# Patient Record
Sex: FEMALE | Race: WHITE | NOT HISPANIC OR LATINO | Employment: FULL TIME | URBAN - METROPOLITAN AREA
[De-identification: names, ages, dates, MRNs, and addresses within clinical notes are randomized per-mention and may not be internally consistent; named-entity substitution may affect disease eponyms.]

---

## 2022-10-05 ENCOUNTER — TELEPHONE (OUTPATIENT)
Dept: NEUROLOGY | Facility: CLINIC | Age: 52
End: 2022-10-05

## 2022-10-05 NOTE — TELEPHONE ENCOUNTER
Per staff message   Pt accepted appt for 10/07/2022 130pm in the [facility] location with Dr Leila Marin

## 2022-10-05 NOTE — TELEPHONE ENCOUNTER
Pt accepted appt for 10/07/2022 130pm in the [facility] location with Dr Keane Lux

## 2022-10-05 NOTE — TELEPHONE ENCOUNTER
Received message from Nayla Campbell- pt's sister in law reached out to advise pt needs appt with Dr Kasper/MS team due to recent MRI findings  Dr Kasper is no longer accepting new MS patients  Dr Mendes however does have availability on Friday  Pulled records from [facility]  Per MRI brain results, "Impression:     1  Multiple T2/FLAIR signal hyperintensities within the periventricular and   juxtacortical white matter many which demonstrate contrast enhancement  Findings   highly suggestive of multifocal demyelination/multiple sclerosis  2  The largest presumed demyelinating plaque within the right periventricular   white matter measures roughly 7 mm "    Pt's PCP placed referral for tremor, numbness & tingling, and abnormal brain MRI  TT sent to Dr DOMINGUEZ  She is agreeable to see patient  States she will also ask Aiyana Dixon to assist with scheduling

## 2022-10-07 ENCOUNTER — APPOINTMENT (OUTPATIENT)
Dept: LAB | Facility: MEDICAL CENTER | Age: 52
End: 2022-10-07
Payer: COMMERCIAL

## 2022-10-07 ENCOUNTER — CONSULT (OUTPATIENT)
Dept: NEUROLOGY | Facility: CLINIC | Age: 52
End: 2022-10-07
Payer: COMMERCIAL

## 2022-10-07 ENCOUNTER — TELEPHONE (OUTPATIENT)
Dept: NEUROLOGY | Facility: CLINIC | Age: 52
End: 2022-10-07

## 2022-10-07 VITALS
RESPIRATION RATE: 18 BRPM | BODY MASS INDEX: 26.02 KG/M2 | WEIGHT: 156.2 LBS | HEIGHT: 65 IN | SYSTOLIC BLOOD PRESSURE: 120 MMHG | DIASTOLIC BLOOD PRESSURE: 78 MMHG | HEART RATE: 79 BPM | TEMPERATURE: 98.1 F

## 2022-10-07 DIAGNOSIS — R25.1 TREMOR: Primary | ICD-10-CM

## 2022-10-07 DIAGNOSIS — G37.9 CNS DEMYELINATION (HCC): ICD-10-CM

## 2022-10-07 DIAGNOSIS — G37.9 CNS DEMYELINATION (HCC): Primary | ICD-10-CM

## 2022-10-07 DIAGNOSIS — F43.22 ADJUSTMENT DISORDER WITH ANXIOUS MOOD: ICD-10-CM

## 2022-10-07 DIAGNOSIS — R29.898 RIGHT ARM WEAKNESS: ICD-10-CM

## 2022-10-07 DIAGNOSIS — R25.1 TREMOR: ICD-10-CM

## 2022-10-07 LAB
BUN SERPL-MCNC: 11 MG/DL (ref 5–25)
CREAT SERPL-MCNC: 0.74 MG/DL (ref 0.6–1.3)
CRP SERPL QL: 6 MG/L
ERYTHROCYTE [SEDIMENTATION RATE] IN BLOOD: 24 MM/HOUR (ref 0–29)
GFR SERPL CREATININE-BSD FRML MDRD: 93 ML/MIN/1.73SQ M

## 2022-10-07 PROCEDURE — 85652 RBC SED RATE AUTOMATED: CPT

## 2022-10-07 PROCEDURE — 86363 MOG-IGG1 ANTB FLO CYTMTRY EA: CPT

## 2022-10-07 PROCEDURE — 86431 RHEUMATOID FACTOR QUANT: CPT

## 2022-10-07 PROCEDURE — 84520 ASSAY OF UREA NITROGEN: CPT

## 2022-10-07 PROCEDURE — 99205 OFFICE O/P NEW HI 60 MIN: CPT | Performed by: PSYCHIATRY & NEUROLOGY

## 2022-10-07 PROCEDURE — 36415 COLL VENOUS BLD VENIPUNCTURE: CPT

## 2022-10-07 PROCEDURE — 86140 C-REACTIVE PROTEIN: CPT

## 2022-10-07 PROCEDURE — 82565 ASSAY OF CREATININE: CPT

## 2022-10-07 PROCEDURE — 86038 ANTINUCLEAR ANTIBODIES: CPT

## 2022-10-07 PROCEDURE — 86053 AQAPRN-4 ANTB FLO CYTMTRY EA: CPT

## 2022-10-07 RX ORDER — SODIUM CHLORIDE 9 MG/ML
20 INJECTION, SOLUTION INTRAVENOUS ONCE
Status: CANCELLED | OUTPATIENT
Start: 2022-10-10

## 2022-10-07 RX ORDER — ZOLPIDEM TARTRATE 10 MG/1
10 TABLET ORAL
Qty: 30 TABLET | Refills: 0 | Status: SHIPPED | OUTPATIENT
Start: 2022-10-07

## 2022-10-07 RX ORDER — FAMOTIDINE 40 MG/1
40 TABLET, FILM COATED ORAL DAILY
Qty: 20 TABLET | Refills: 2 | Status: SHIPPED | OUTPATIENT
Start: 2022-10-07

## 2022-10-07 RX ORDER — FLUOXETINE 10 MG/1
10 CAPSULE ORAL DAILY
Qty: 30 CAPSULE | Refills: 1 | Status: SHIPPED | OUTPATIENT
Start: 2022-10-07

## 2022-10-07 NOTE — TELEPHONE ENCOUNTER
Pt has [employer]- per website [id number], [id number], and [id number] do not require PA  Therapy plan entered and sent for signature  TT sent  Referral updated

## 2022-10-07 NOTE — TELEPHONE ENCOUNTER
Plan is signed  Called [facility] infusion center and spoke with Corinne Payne  Scheduled pt for the following:    Monday 10/10/22 @ 1:30pm  Tuesday 10/11/22 @ 12pm  Thursday 10/13/22 @ 12pm  (There were absolutely no appointments on Wednesday)    Left detailed message for pt making her aware (okay per communication consent)  Provided phone # and address for infusion center  Asked for call back to confirm  MyChart message also sent

## 2022-10-07 NOTE — PROGRESS NOTES
Patient ID: Daksha Gallardo is a 46 y o  female  Assessment/Plan:           Problem List Items Addressed This Visit        Nervous and Auditory    CNS demyelination (HCC)    Relevant Medications    FLUoxetine (PROzac) 10 mg capsule    Other Relevant Orders    MRI cervical spine with and without contrast    MRI thoracic spine with and without contrast    Sedimentation rate, automated (Completed)    C-reactive protein (Completed)    KLEBER w/Reflex if Positive    MISCELLANEOUS LAB TEST    Rheumatoid Factor (IgA, IgG, IgM)    BUN (Completed)    Creatinine, serum (Completed)    Right arm weakness    Relevant Medications    FLUoxetine (PROzac) 10 mg capsule    Other Relevant Orders    MISCELLANEOUS LAB TEST    Rheumatoid Factor (IgA, IgG, IgM)       Other    Tremor - Primary    Relevant Orders    MRI cervical spine with and without contrast    MRI thoracic spine with and without contrast    MISCELLANEOUS LAB TEST    BUN (Completed)    Creatinine, serum (Completed)      Other Visit Diagnoses     Adjustment disorder with anxious mood        Relevant Medications    FLUoxetine (PROzac) 10 mg capsule         Mrs Anderson President has presented to [facility] multiple [address] with multiple neurological complaints and to rule out multiple sclerosis  Patient has developed multiple neurological complaints since 2012, including inability to walk with right-sided sensory dysfunction and local weakness  Patient was diagnosed with Hashimoto thyroiditis and fibromyalgia by other providers  Brain MRI was personally reviewed, patient was found to have multiple demyelinating plaques in periventricular and juxtacortical region, some of the lesions are with contrast enhancement  We extensively discussed potential differential diagnosis of clinical and radiographic findings, rheumatological, granulomatous condition, other inflammatory condition may present in similar pattern    Patient will be advised to consider 3 doses of Solu-Medrol 1 g IV, sleeping aid and Pepcid will be further recommended  Patient is to complete serological workup as well as imaging of the cervical and thoracic spine to comment on extension of her CNS demyelination  Patient was offered Prozac for mood disorder  We also discussed importance of healthy dietary approach and regular physical activity; patient may benefit from psychiatry and psychotherapy sessions  Patient ambulates without assistive devices  Patient is to follow with [facility] within 6-8 weeks  Subjective:tremors, numbness and tingling hands, abnormal MRI  HPI    Mrs David Cosby is a 51-year-old female who presented sensory dysfunction upper extremity and abnormal brain imaging  Patient described initial imaging of the brain completed in 2006 due to sinus infection and frequent headaches  Patient stated around 2012 she could not walk with achy and sore muscles described that time  Patient was diagnosed with Hashimoto and fibromyalgia  In April 2022 patient developed right upper extremity weakness with sharp pain  Patient stated pain at times severe enough which she is screaming in front of her kids  Patient also described right hip pain which can be sharp  Tightness in the ball of the right foot described in July of 2022  Patient also described having in her perception of tremulous since August 2022 which get worse and involves entire body; patient described significant stress in her life since 23/14 with multiple family members does and advanced disability, including quadriplegia in her sister due to drug abuse; patient stated she had son with SJS;   Patient described having viral infection requiring 2 antibacterial regimens recently  Patient had COVID-19 infection earlier as it took her 20 days to recover  Patient also stated she had hip progression joint pain  Patient described having mother with Hilario Combs and 2nd cousin with multiple sclerosis      MRI brain 9/2022: Multiple T2/FLAIR signal hyperintensities within the periventricular and   juxtacortical white matter many which demonstrate contrast enhancement  Findings   highly suggestive of multifocal demyelination/multiple sclerosis  2  The largest presumed demyelinating plaque within the right periventricular   white matter measures roughly 7 mm  MRI orbits/head and neck 2006: Note is made of partial opacification involving the left frontal sinus and left anterior ethmoid air cells as well as complete opacification of the left maxillary sinus  There appear to be secretions present within the sinus  The orbital floor appears intact  The visualized portions of the brain available for review are unremarkable  Cerebral parenchyma: There are multiple patchy more focal foci of increased   T2/FLAIR signal hyperintensity within the periventricular and juxtacortical   white matter bilaterally, many which demonstrate contrast enhancement  The   imaging morphology and distribution suggest demyelinating disease/multiple   sclerosis  Largest enhancing demyelinating plaque within the right posterior   corona radiata adjacent to the trigone of the lateral ventricle measures   approximately 0 9 x 0 7 cm  Enhancing focus within the right temporal lobe   measuring 0 8 x 0 4 cm There are greater than 10 the monitoring plaques   visualized within the supratentorial compartment  A few punctate foci of   enhancement within the left corona radiata, left peritrigonal white matter,   right occipital subcortical white matter  The following portions of the patient's history were reviewed and updated as appropriate:   She  has no past medical history on file  She   Patient Active Problem List    Diagnosis Date Noted   • Tremor 10/07/2022   • CNS demyelination ([facility] [address] ) 10/07/2022   • Right arm weakness 10/07/2022     She  has no past surgical history on file  Her family history is not on file    She  reports that she has been smoking cigarettes  She does not have any smokeless tobacco history on file  No history on file for alcohol use and drug use  Current Outpatient Medications   Medication Sig Dispense Refill   • FLUoxetine (PROzac) 10 mg capsule Take 1 capsule (10 mg total) by mouth daily 30 capsule 1   • famotidine (PEPCID) 40 MG tablet Take 1 tablet (40 mg total) by mouth daily 20 tablet 2   • zolpidem (AMBIEN) 10 mg tablet Take 1 tablet (10 mg total) by mouth daily at bedtime as needed for sleep 30 tablet 0     No current facility-administered medications for this visit  No current outpatient medications on file prior to visit  No current facility-administered medications on file prior to visit  She has no allergies on file            Objective:    Blood pressure 120/78, pulse 79, temperature 98 1 °F (36 7 °C), temperature source Tympanic, resp  rate 18, height 5' 5" (1 651 m), weight 70 9 kg (156 lb 3 2 oz)  Physical Exam    Neurological Exam  CONSTITUTIONAL: NAD, pleasant  NECK: supple, no lymphadenopathy, no thyromegaly, no JVD  CARDIOVASCULAR: RRR, normal S1S2, no murmurs, no rubs  RESP: clear to auscultation bilaterally, no wheezes/rhonchi/rales  ABDOMEN: soft, non tender, non distended  SKIN: no rash or skin lesions  EXTREMITIES: no edema, pulses 2+bilaterally  PSYCH: appropriate mood and affect  NEUROLOGIC COMPREHENSIVE EXAM: Patient is oriented to person, place and time, NAD; appropriate affect  CN II, III, IV, V, VI, VII,VIII,IX,X,XI-XII intact with EOMI, PERRLA, OKN intact, VF grossly intact, fundi poorly visualized secondary to pupillary constriction; symmetric face noted  Motor: 5/5 UE/LE bilateral symmetric, RUE 4/5 shoulder abduction; Sensory: intact to light touch and pinprick bilaterally; normal vibration sensation feet bilaterally; Coordination within normal limits on FTN and DAKOTA testing; DTR: 2++/4 through, no Babinski, no clonus  Tandem gait is abnormal  Romberg: present        ROS:  12 points of review of system was reviewed with the patient and was unremarkable with exception: see HPI  Review of Systems   Constitutional: Positive for fatigue  Negative for chills and fever  HENT: Positive for tinnitus (right ear)  Negative for ear pain and sore throat  Eyes: Positive for visual disturbance (blurry )  Negative for pain  Respiratory: Negative for cough and shortness of breath  Cardiovascular: Negative for chest pain and palpitations  Gastrointestinal: Negative for abdominal pain and vomiting  Genitourinary: Negative for dysuria and hematuria  Musculoskeletal: Negative for arthralgias and back pain  Skin: Negative for color change and rash  Neurological: Positive for tremors (bilateral hands and feet - slight) and numbness (right arm)  Negative for seizures and syncope  All other systems reviewed and are negative

## 2022-10-07 NOTE — TELEPHONE ENCOUNTER
Please help with 3 doses of Solumedrol 1000 mg IV - [facility]

## 2022-10-09 LAB — RYE IGE QN: NEGATIVE

## 2022-10-10 ENCOUNTER — HOSPITAL ENCOUNTER (OUTPATIENT)
Dept: INFUSION CENTER | Facility: HOSPITAL | Age: 52
Discharge: HOME | End: 2022-10-10
Attending: PSYCHIATRY & NEUROLOGY
Payer: COMMERCIAL

## 2022-10-10 VITALS
HEART RATE: 86 BPM | RESPIRATION RATE: 18 BRPM | DIASTOLIC BLOOD PRESSURE: 66 MMHG | TEMPERATURE: 97.8 F | OXYGEN SATURATION: 96 % | SYSTOLIC BLOOD PRESSURE: 117 MMHG

## 2022-10-10 DIAGNOSIS — G37.9 CNS DEMYELINATION (HCC): Primary | ICD-10-CM

## 2022-10-10 PROCEDURE — 96365 THER/PROPH/DIAG IV INF INIT: CPT

## 2022-10-10 RX ORDER — SODIUM CHLORIDE 9 MG/ML
20 INJECTION, SOLUTION INTRAVENOUS ONCE
Status: COMPLETED | OUTPATIENT
Start: 2022-10-10 | End: 2022-10-10

## 2022-10-10 RX ORDER — SODIUM CHLORIDE 9 MG/ML
20 INJECTION, SOLUTION INTRAVENOUS ONCE
Status: CANCELLED | OUTPATIENT
Start: 2022-10-11

## 2022-10-10 RX ADMIN — SODIUM CHLORIDE 20 ML/HR: 0.9 INJECTION, SOLUTION INTRAVENOUS at 13:38

## 2022-10-10 RX ADMIN — SODIUM CHLORIDE 1000 MG: 0.9 INJECTION, SOLUTION INTRAVENOUS at 13:38

## 2022-10-10 NOTE — PROGRESS NOTES
Pt ambulated into infusion in stable condition  pt oriented to unit  Pt states she does have tremors, that is why tx needed to be started so soon

## 2022-10-10 NOTE — PLAN OF CARE
Problem: Potential for Falls  Goal: Patient will remain free of falls  Description: INTERVENTIONS:  - Educate patient/family on patient safety including physical limitations  - Instruct patient to call for assistance with activity   - Consult OT/PT to assist with strengthening/mobility   - Keep Call bell within reach  - Keep bed low and locked with side rails adjusted as appropriate  - Keep care items and personal belongings within reach  - Initiate and maintain comfort rounds      - Consider moving patient to room near nurses station  Outcome: Progressing     Problem: DISCHARGE PLANNING  Goal: Discharge to home or other facility with appropriate resources  Description: INTERVENTIONS:  - Identify barriers to discharge w/patient and caregiver  - Arrange for needed discharge resources and transportation as appropriate  - Identify discharge learning needs (meds, wound care, etc )  - Arrange for interpretive services to assist at discharge as needed  - Refer to Case Management Department for coordinating discharge planning if the patient needs post-hospital services based on physician/advanced practitioner order or complex needs related to functional status, cognitive ability, or social support system  Outcome: Progressing     Problem: Knowledge Deficit  Goal: Patient/family/caregiver demonstrates understanding of disease process, treatment plan, medications, and discharge instructions  Description: Complete learning assessment and assess knowledge base    Interventions:  - Provide teaching at level of understanding  - Provide teaching via preferred learning methods  Outcome: Progressing

## 2022-10-11 ENCOUNTER — TELEPHONE (OUTPATIENT)
Dept: NEUROLOGY | Facility: CLINIC | Age: 52
End: 2022-10-11

## 2022-10-11 ENCOUNTER — HOSPITAL ENCOUNTER (OUTPATIENT)
Dept: INFUSION CENTER | Facility: HOSPITAL | Age: 52
Discharge: HOME | End: 2022-10-11
Attending: PSYCHIATRY & NEUROLOGY
Payer: COMMERCIAL

## 2022-10-11 VITALS
OXYGEN SATURATION: 95 % | SYSTOLIC BLOOD PRESSURE: 117 MMHG | TEMPERATURE: 97.9 F | RESPIRATION RATE: 18 BRPM | HEART RATE: 109 BPM | DIASTOLIC BLOOD PRESSURE: 74 MMHG

## 2022-10-11 DIAGNOSIS — G37.9 CNS DEMYELINATION (HCC): Primary | ICD-10-CM

## 2022-10-11 PROCEDURE — 96365 THER/PROPH/DIAG IV INF INIT: CPT

## 2022-10-11 RX ORDER — CYANOCOBALAMIN (VITAMIN B-12) 500 MCG
1000 TABLET ORAL
COMMUNITY

## 2022-10-11 RX ORDER — MELATONIN
1000 DAILY
COMMUNITY

## 2022-10-11 RX ORDER — DIPHENHYDRAMINE HCL 25 MG
25 CAPSULE ORAL
COMMUNITY

## 2022-10-11 RX ORDER — SODIUM CHLORIDE 9 MG/ML
20 INJECTION, SOLUTION INTRAVENOUS ONCE
Status: CANCELLED | OUTPATIENT
Start: 2022-10-13

## 2022-10-11 RX ORDER — SODIUM CHLORIDE 9 MG/ML
20 INJECTION, SOLUTION INTRAVENOUS ONCE
Status: COMPLETED | OUTPATIENT
Start: 2022-10-11 | End: 2022-10-11

## 2022-10-11 RX ADMIN — SODIUM CHLORIDE 1000 MG: 0.9 INJECTION, SOLUTION INTRAVENOUS at 12:20

## 2022-10-11 RX ADMIN — SODIUM CHLORIDE 20 ML/HR: 0.9 INJECTION, SOLUTION INTRAVENOUS at 12:04

## 2022-10-11 NOTE — TELEPHONE ENCOUNTER
Phone pt to ask if we send MRI Brain Cd dos 09/29/2022 to mail or to keep to next appt  Pt prefers to keep at our office till next appt 11/11/2022 will keep in  in [place]

## 2022-10-13 ENCOUNTER — HOSPITAL ENCOUNTER (OUTPATIENT)
Dept: INFUSION CENTER | Facility: HOSPITAL | Age: 52
Discharge: HOME | End: 2022-10-13
Attending: PSYCHIATRY & NEUROLOGY
Payer: COMMERCIAL

## 2022-10-13 VITALS
TEMPERATURE: 97.8 F | OXYGEN SATURATION: 93 % | SYSTOLIC BLOOD PRESSURE: 125 MMHG | HEART RATE: 93 BPM | RESPIRATION RATE: 18 BRPM | DIASTOLIC BLOOD PRESSURE: 68 MMHG

## 2022-10-13 DIAGNOSIS — G37.9 CNS DEMYELINATION (HCC): Primary | ICD-10-CM

## 2022-10-13 LAB — MISCELLANEOUS LAB TEST RESULT: NORMAL

## 2022-10-13 PROCEDURE — 96365 THER/PROPH/DIAG IV INF INIT: CPT

## 2022-10-13 RX ORDER — SODIUM CHLORIDE 9 MG/ML
20 INJECTION, SOLUTION INTRAVENOUS ONCE
Status: COMPLETED | OUTPATIENT
Start: 2022-10-13 | End: 2022-10-13

## 2022-10-13 RX ORDER — SODIUM CHLORIDE 9 MG/ML
20 INJECTION, SOLUTION INTRAVENOUS ONCE
Status: CANCELLED | OUTPATIENT
Start: 2022-10-13

## 2022-10-13 RX ADMIN — SODIUM CHLORIDE 20 ML/HR: 0.9 INJECTION, SOLUTION INTRAVENOUS at 14:51

## 2022-10-13 RX ADMIN — SODIUM CHLORIDE 1000 MG: 0.9 INJECTION, SOLUTION INTRAVENOUS at 14:52

## 2022-10-13 NOTE — PLAN OF CARE
Problem: Potential for Falls  Goal: Patient will remain free of falls  Description: INTERVENTIONS:  - Educate patient/family on patient safety including physical limitations  - Instruct patient to call for assistance with activity   - Consult OT/PT to assist with strengthening/mobility   - Keep Call bell within reach  - Keep bed low and locked with side rails adjusted as appropriate  - Keep care items and personal belongings within reach  - Initiate and maintain comfort rounds  -    - Apply yellow socks and bracelet for high fall risk patients  - Consider moving patient to room near nurses station  Outcome: Progressing     Problem: DISCHARGE PLANNING  Goal: Discharge to home or other facility with appropriate resources  Description: INTERVENTIONS:  - Identify barriers to discharge w/patient and caregiver  - Arrange for needed discharge resources and transportation as appropriate  - Identify discharge learning needs (meds, wound care, etc )  - Arrange for interpretive services to assist at discharge as needed  - Refer to Case Management Department for coordinating discharge planning if the patient needs post-hospital services based on physician/advanced practitioner order or complex needs related to functional status, cognitive ability, or social support system  Outcome: Progressing     Problem: Knowledge Deficit  Goal: Patient/family/caregiver demonstrates understanding of disease process, treatment plan, medications, and discharge instructions  Description: Complete learning assessment and assess knowledge base    Interventions:  - Provide teaching at level of understanding  - Provide teaching via preferred learning methods  Outcome: Progressing

## 2022-10-19 ENCOUNTER — TELEPHONE (OUTPATIENT)
Dept: NEUROLOGY | Facility: CLINIC | Age: 52
End: 2022-10-19

## 2022-10-19 NOTE — TELEPHONE ENCOUNTER
----- Message from Crawford Apgar, MD sent at 10/18/2022  1:12 PM EDT -----  Please review mildly elevated Rf IGG - patient is to follow with PCP for further recommendations

## 2022-10-21 ENCOUNTER — HOSPITAL ENCOUNTER (OUTPATIENT)
Dept: MRI IMAGING | Facility: HOSPITAL | Age: 52
Discharge: HOME | End: 2022-10-21
Attending: PSYCHIATRY & NEUROLOGY
Payer: COMMERCIAL

## 2022-10-21 DIAGNOSIS — R25.1 TREMOR: ICD-10-CM

## 2022-10-21 DIAGNOSIS — G37.9 CNS DEMYELINATION (HCC): ICD-10-CM

## 2022-10-21 PROCEDURE — A9585 GADOBUTROL INJECTION: HCPCS | Performed by: PSYCHIATRY & NEUROLOGY

## 2022-10-21 PROCEDURE — 72156 MRI NECK SPINE W/O & W/DYE: CPT

## 2022-10-21 PROCEDURE — 72157 MRI CHEST SPINE W/O & W/DYE: CPT

## 2022-10-21 PROCEDURE — G1004 CDSM NDSC: HCPCS

## 2022-10-21 RX ADMIN — GADOBUTROL 7 ML: 604.72 INJECTION INTRAVENOUS at 14:54

## 2022-10-21 RX ADMIN — GADOBUTROL 7 ML: 604.72 INJECTION INTRAVENOUS at 15:03

## 2022-10-26 ENCOUNTER — PATIENT MESSAGE (OUTPATIENT)
Dept: NEUROLOGY | Facility: CLINIC | Age: 52
End: 2022-10-26

## 2022-11-03 NOTE — PATIENT COMMUNICATION
Called patient  Received vm  Left detailed msg (per consent in chart) advising patient that chronic means longstanding  Not acute or new  Advised patient Dr Jerel Sosa will be going over MRI results at patient's f/u appt on 11/11/22

## 2022-11-09 ENCOUNTER — TELEPHONE (OUTPATIENT)
Dept: NEUROLOGY | Facility: CLINIC | Age: 52
End: 2022-11-09

## 2022-11-09 NOTE — TELEPHONE ENCOUNTER
Reminder appt call     Patient is scheduled on 11/11/2022 @ 11 am with an arrival time  1045 am in the [facility] location with Dr Harmeet Kearns       Left message on vm (1)

## 2022-11-11 ENCOUNTER — TELEMEDICINE (OUTPATIENT)
Dept: NEUROLOGY | Facility: CLINIC | Age: 52
End: 2022-11-11

## 2022-11-11 ENCOUNTER — TELEPHONE (OUTPATIENT)
Dept: NEUROLOGY | Facility: CLINIC | Age: 52
End: 2022-11-11

## 2022-11-11 VITALS — HEIGHT: 65 IN | WEIGHT: 152 LBS | BODY MASS INDEX: 25.33 KG/M2

## 2022-11-11 DIAGNOSIS — R76.8 RHEUMATOID FACTOR POSITIVE: ICD-10-CM

## 2022-11-11 DIAGNOSIS — G35 MS (MULTIPLE SCLEROSIS) (HCC): Primary | ICD-10-CM

## 2022-11-11 DIAGNOSIS — R29.898 RIGHT ARM WEAKNESS: ICD-10-CM

## 2022-11-11 DIAGNOSIS — R20.0 NUMBNESS OF RIGHT FOOT: ICD-10-CM

## 2022-11-11 NOTE — PROGRESS NOTES
Virtual Regular Visit    Verification of patient location:    Patient is located in the following state [place] which I [place]  Assessment/Plan:    Problem List Items Addressed This Visit        Nervous and Auditory    Right arm weakness    Relevant Orders    Ambulatory Referral to Rheumatology    MS (multiple sclerosis) ([facility] [address] ) - Primary       Other    Numbness of right foot    Relevant Orders    Ambulatory Referral to Rheumatology      Other Visit Diagnoses     Rheumatoid factor positive        Relevant Orders    Ambulatory Referral to Rheumatology               Reason for visit is FU  Chief Complaint   Patient presents with   • Virtual Regular Visit        Encounter provider Glen Mahajan MD    Provider located at [address] [facility]  [address] [id number]      Recent Visits  No visits were found meeting these conditions  Showing recent visits within past 7 days and meeting all other requirements  Today's Visits  Date Type Provider Dept   11/11/22 Telephone Glen Mahajan MD [facility] [place]   11/11/22 Telemedicine Glen Mahajan MD [facility] [place]   Showing today's visits and meeting all other requirements  Future Appointments  No visits were found meeting these conditions  Showing future appointments within next 150 days and meeting all other requirements       The patient was identified by name and date of birth  Jerel Titus was informed that this is a telemedicine visit and that the visit is being conducted through the [employer]  She agrees to proceed     My office door was closed  No one else was in the room  She acknowledged consent and understanding of privacy and security of the video platform  The patient has agreed to participate and understands they can discontinue the visit at any time  Patient is aware this is a billable service       Subjective  Jerel Titus is a 46 y o  female with MS and related issues   HPI   Mrs Lo Villanueva has presented to [facility] multiple [address] with multiple neurological complaints  Patient had completed 3 doses of Solu-Medrol with side effects has been reported  Patient finally feels good right now for the past week the patient with tremors has near completely improved  Patient stated she has a right foot sensory dysfunction on an off at the ball of her foot with much less sensory dysfunction in the left foot as well  Patient has developed multiple neurological complaints since 2012, including inability to walk with right-sided sensory dysfunction and local weakness  Patient was diagnosed with Hashimoto thyroiditis and fibromyalgia by other providers  Brain MRI was personally reviewed, patient was found to have multiple demyelinating plaques in periventricular and juxtacortical region, some of the lesions are with contrast enhancement  Patient is to complete serological workup as well  Patient was offered Prozac for mood disorder      We also discussed importance of healthy dietary approach and regular physical activity; patient may benefit from psychiatry and psychotherapy sessions  MRI C-spine 10/2022: No evidence of demyelinating disease or abnormal enhancement in cervical spine      Please see same-day MRI thoracic spine with and without contrast for further evaluation  1 3 cm T2 hyperintense nodule in right thyroid lobe (7:2)      MRI T-spine 10/2021: Small demyelinating lesion in T12 spinal cord  No abnormal enhancement in thoracic spine to suggest active demyelination      Mild degenerative changes of thoracic spine  No significant canal stenosis or foraminal narrowing  Minerva Riedel    Mrs Lo Villanueva has presented to [facility] multiple [address] for follow-up  Patient completed radiographic and serological workup, she is here today to discuss her findings    Patient previously was recognize multiple demyelinating plaque in her brain parenchyma including chronic and acute as she received 3 doses of Solu-Medrol  Patient had suffered from steroid side effects, eventually patient improved and now she is feeling well  Tremors has subsided  Patient completed imaging cervical and thoracic spine she has single demyelinating plaque at T12, no active demyelination appreciated  Considering clinical and radiographic presentation, patient meets 2017 [employer] criteria for diagnosis of multiple sclerosis  Serologic workup was completed as patient negative for NMO/MOG  Patient does not require spinal tap at this point considering her radiographic findings  Patient does have borderline elevated rheumatoid factor IgG antibody as well as C-reactive protein at 6 0, patient will follow with Rheumatology team to complete her workup  Patient was diagnosed with likely benign relapsing remitting multiple sclerosis with good repairing capacity, the same time patient does have clinical presentation described 10 years ago with residual sensory dysfunction in her feet noted now  Patient was advised to consider Aubagio versus Vumerity, we briefly discussed side effect profile as well as risk and benefits of initiating disease modifying regimen for multiple sclerosis  Patient is to consider which regimen she would prefer up going forward, patient favors Aubagio at this point  Booklet and forms for both regimens will be mailed to the patient  Patient ambulates without assistive devices  History reviewed  No pertinent past medical history  History reviewed  No pertinent surgical history      Current Outpatient Medications   Medication Sig Dispense Refill   • cholecalciferol (VITAMIN D3) 1,000 units tablet Take 1,000 Units by mouth daily Take 2 tabs (2000mg) PO daily     • Cyanocobalamin (Vitamin B 12) 500 MCG TABS Take 1,000 mcg by mouth Take 2 tabs (1000mg) PO daily     • famotidine (PEPCID) 40 MG tablet Take 1 tablet (40 mg total) by mouth daily 20 tablet 2   • FLUoxetine (PROzac) 10 mg capsule Take 1 capsule (10 mg total) by mouth daily 30 capsule 1   • diphenhydrAMINE (BENADRYL) 25 mg capsule Take 25 mg by mouth daily at bedtime as needed for sleep Take 25mg PO PRN for insomnia     • zolpidem (AMBIEN) 10 mg tablet Take 1 tablet (10 mg total) by mouth daily at bedtime as needed for sleep (Patient not taking: Reported on 10/11/2022) 30 tablet 0     No current facility-administered medications for this visit  Allergies   Allergen Reactions   • Sulfa Antibiotics Other (See Comments)     Family history ngo jermaine syndrone   • Amoxicillin-Pot Clavulanate Diarrhea   • [employer] Fatigue       Review of Systems   Constitutional: Positive for fatigue  Negative for chills and fever  HENT: Negative for ear pain and sore throat  Eyes: Negative for pain and visual disturbance  Respiratory: Negative for cough and shortness of breath  Cardiovascular: Negative for chest pain and palpitations  Gastrointestinal: Negative for abdominal pain and vomiting  Genitourinary: Negative for dysuria and hematuria  Musculoskeletal: Negative for arthralgias and back pain  Skin: Negative for color change and rash  Neurological: Positive for tremors (bilateral legs)  Negative for seizures and syncope  All other systems reviewed and are negative  Video Exam    Vitals:    11/11/22 1056   Weight: 68 9 kg (152 lb)   Height: 5' 5" (1 651 m)       Physical Exam   CONSTITUTIONAL: NAD, pleasant  NECK: supple, PSYCH: appropriate mood and affect  NEUROLOGIC COMPREHENSIVE EXAM: Patient is oriented to person, place and time, NAD; appropriate affect  CN II, III, IV, V, VI, VII,VIII,IX,X,XI-XII intact with EOMI; symmetric face noted  Motor: grossly intact UE/LE bilateral symmetric; Coordination within normal limits on FTN and DAKOTA testing; Tandem gait is intact     I spent 25 minutes with patient today in which greater than 50% of the time was spent in counseling/coordination of care regarding Multiple sclerosis and related issues

## 2022-11-14 ENCOUNTER — TELEPHONE (OUTPATIENT)
Dept: NEUROLOGY | Facility: CLINIC | Age: 52
End: 2022-11-14

## 2022-11-14 NOTE — TELEPHONE ENCOUNTER
Phone pt to ask if she will be picking up CD of imaging or mail out to pt  Patient advised me to mail one CD MRI Brain dos 09/29/2022 to pt address we have on [address] and confirmed address

## 2022-11-15 DIAGNOSIS — G37.9 CNS DEMYELINATION (HCC): ICD-10-CM

## 2022-11-15 DIAGNOSIS — R29.898 RIGHT ARM WEAKNESS: ICD-10-CM

## 2022-11-15 DIAGNOSIS — F43.22 ADJUSTMENT DISORDER WITH ANXIOUS MOOD: ICD-10-CM

## 2022-11-17 RX ORDER — FLUOXETINE 10 MG/1
CAPSULE ORAL
Qty: 30 CAPSULE | Refills: 1 | Status: SHIPPED | OUTPATIENT
Start: 2022-11-17

## 2022-11-21 ENCOUNTER — TELEPHONE (OUTPATIENT)
Dept: NEUROLOGY | Facility: CLINIC | Age: 52
End: 2022-11-21

## 2022-11-21 DIAGNOSIS — G37.9 CNS DEMYELINATION (HCC): Primary | ICD-10-CM

## 2022-11-21 NOTE — TELEPHONE ENCOUNTER
Pt left voicemail stating she received medication start form and completed her section  Asked what she should do with the form  [phone]     Per last office note, start forms were sent for Aubagio and Vumerity  Pt will need to return signed form to our office  Called and spoke with pt  She reports she prefers Aubagio but will return both forms to our office  Pt agreeable to faxing this  Fax # provided  Awaiting start forms

## 2022-11-23 NOTE — TELEPHONE ENCOUNTER
Faxed forms received from pt  Placed in [place] fax folder  Aston Abreu, please assist  Pt pradeeps Gian  Thanks!

## 2022-12-05 NOTE — TELEPHONE ENCOUNTER
Aubagio Start form completed, signed and faxed to [phone] along with the copy of insurance card, med list and allergy list       Aubagio  Start Form will scan into patient's chart along with fax confirmation sheet when scanner available to me  Aubagio  Start Form scanned into pts chart

## 2022-12-06 NOTE — TELEPHONE ENCOUNTER
Awaiting start form to be scanned into chart  Pt prefers Gian DOMINGUEZ, no TB testing completed in the last year  Would you like this completed prior to pt starting Aubagio?

## 2022-12-07 NOTE — TELEPHONE ENCOUNTER
Hi, this is Nando Easley calling from [facility] regarding Dr Aurora Nieves pt, Alexsander Santillan,  [birth year]  We are in need of a prior auth for this patient  If you have an approval or denial already, Can fax that over to [employer]ya [employer] one  Their number is [phone]  You have any questions,you can give me a call at [phone]  Thank you

## 2022-12-07 NOTE — TELEPHONE ENCOUNTER
Left detailed message for pt (okay per communication consent) making her aware of needed lab prior to moving forward in start process  Start form in chart  Pt to take Aubagio 7mg x30 days then 14mg daily  Awaiting BI from [employer]

## 2022-12-08 ENCOUNTER — APPOINTMENT (OUTPATIENT)
Dept: LAB | Facility: CLINIC | Age: 52
End: 2022-12-08

## 2022-12-08 DIAGNOSIS — G37.9 CNS DEMYELINATION (HCC): ICD-10-CM

## 2022-12-10 LAB
GAMMA INTERFERON BACKGROUND BLD IA-ACNC: 0.03 IU/ML
M TB IFN-G BLD-IMP: NEGATIVE
M TB IFN-G CD4+ BCKGRND COR BLD-ACNC: 0.01 IU/ML
M TB IFN-G CD4+ BCKGRND COR BLD-ACNC: 0.01 IU/ML
MITOGEN IGNF BCKGRD COR BLD-ACNC: >10 IU/ML

## 2022-12-12 DIAGNOSIS — G37.9 CNS DEMYELINATION (HCC): ICD-10-CM

## 2022-12-12 RX ORDER — FAMOTIDINE 40 MG/1
TABLET, FILM COATED ORAL
Qty: 20 TABLET | Refills: 2 | Status: SHIPPED | OUTPATIENT
Start: 2022-12-12

## 2022-12-13 NOTE — TELEPHONE ENCOUNTER
TB results received  Per BI, PA is needed  SPP is [facility]  PA requests submitted:    7mg Key: [id number]   14mg Key: [id number]     Awaiting determination

## 2022-12-16 NOTE — TELEPHONE ENCOUNTER
Jerica Duke is approved from 10/15/22 to 12/14/24  Called Dai Rivera with [employer]  Made her aware of approval  Faxed approval to [facility]  Will f/u with pt to ensure medication is received

## 2022-12-20 DIAGNOSIS — G35 MS (MULTIPLE SCLEROSIS) (HCC): Primary | ICD-10-CM

## 2023-01-06 ENCOUNTER — TELEPHONE (OUTPATIENT)
Dept: NEUROLOGY | Facility: CLINIC | Age: 53
End: 2023-01-06

## 2023-01-06 NOTE — TELEPHONE ENCOUNTER
Reminder appt call     Patient is scheduled for a virtual visit on 01/16/2022 @ with Dr Tereso Montanez  Patient confirmed appt

## 2023-01-16 ENCOUNTER — TELEMEDICINE (OUTPATIENT)
Dept: NEUROLOGY | Facility: CLINIC | Age: 53
End: 2023-01-16

## 2023-01-16 VITALS — BODY MASS INDEX: 24.99 KG/M2 | WEIGHT: 150 LBS | HEIGHT: 65 IN

## 2023-01-16 DIAGNOSIS — G35 MS (MULTIPLE SCLEROSIS) (HCC): Primary | ICD-10-CM

## 2023-01-16 RX ORDER — ERGOCALCIFEROL 1.25 MG/1
50000 CAPSULE ORAL WEEKLY
Qty: 12 CAPSULE | Refills: 0 | Status: SHIPPED | OUTPATIENT
Start: 2023-01-16

## 2023-01-16 NOTE — PROGRESS NOTES
Virtual Regular Visit    Verification of patient location:    Patient is located in the following state in which I hold an active license [place]      Assessment/Plan:    Problem List Items Addressed This Visit        Nervous and Auditory    MS (multiple sclerosis) ([facility] ) - Primary    Relevant Medications    ergocalciferol (VITAMIN D2) 50,000 units            Reason for visit is FU  Chief Complaint   Patient presents with   • Virtual Regular Visit        Encounter provider Humaira Mead MD    Provider located at [address] [facility]  [phone]      Recent Visits  No visits were found meeting these conditions  Showing recent visits within past 7 days and meeting all other requirements  Today's Visits  Date Type Provider Dept   01/16/23 Telemedicine Humaira Mead MD [facility] [place]   Showing today's visits and meeting all other requirements  Future Appointments  No visits were found meeting these conditions  Showing future appointments within next 150 days and meeting all other requirements       The patient was identified by name and date of birth  Desmond Acuña was informed that this is a telemedicine visit and that the visit is being conducted through the [employer]  She agrees to proceed     My office door was closed  No one else was in the room  She acknowledged consent and understanding of privacy and security of the video platform  The patient has agreed to participate and understands they can discontinue the visit at any time  Patient is aware this is a billable service  Subjective  Desmond Acuña is a 46 y o  female with headache and tremors  Pt not sure if sxs related to Aubagio    HPI   Mrs Mau Alba has presented to [facility] multiple [address] for follow-up     Patient presented for follow-up on multiple sclerosis with stable clinical findings has been described, at the same time patient reports having improvement in ambulatory function especially in her lower extremities as she is able to ambulate up and down the stairs in her house  Patient started [facility] January 2, no significant side effects reported  Patient stated due to starting new regimen she started experiencing in her tremulousness again  Patient has intermittent pressure sensation in frontal and temporal regions, nonconcerning as headache had subsided 1 week since she initiated Aubagio  Patient completed imaging cervical and thoracic spine she has single demyelinating plaque at T12, no active demyelination appreciated  Considering clinical and radiographic presentation, patient meets 2017 [employer] criteria for diagnosis of multiple sclerosis  Serologic workup was completed as patient negative for NMO/MOG  Patient does not require spinal tap at this point considering her radiographic findings  Patient does have borderline elevated rheumatoid factor IgG antibody as well as C-reactive protein at 6 0, patient will follow with Rheumatology team to complete her workup  Patient was diagnosed with likely benign relapsing remitting multiple sclerosis with good repairing capacity, the same time patient does have clinical presentation described 10 years ago with residual sensory dysfunction in her feet noted now  Assessment and plan  Mrs Amelia Vazquez has presented to [facility] multiple sclerosis center for follow-up of multiple sclerosis related issues  Patient describes no new focal neurological dysfunction  Patient has significant improvement in in her perception of tremulousness since steroids completed, patient had similar or less intense symptoms right after patient initiated Aubagio 7 mg daily  Patient will proceed with monthly hepatic function panel with first blood work will be completed on February 2  Based on serologic work-up, we will adjust patient regimen accordingly    We reviewed potential side effects of Aubagio including peripheral polyneuropathy which may be seen with higher dose of Aubagio  Patient did suffer mild headaches related to initiating new drug  Patient may safely use ibuprofen up to 2400 mg a day  Patient does have some improvement in her lower extremity function with known history of dementia plaque at T12  Patient had completed all her radiographic testing in September - October 2022, patient will be advised imaging of the brain around November - December 2023, spinal cord MRIs will be offered every 2-3 years  Patient was offered to consider vitamin D to 50,000 international units weekly supplements  Patient has no other questions or concerns with no sleep related dysfunction reported  Patient is to follow [facility] Gabriel neurology within 4 months     History reviewed  No pertinent past medical history  History reviewed  No pertinent surgical history  Current Outpatient Medications   Medication Sig Dispense Refill   • cholecalciferol (VITAMIN D3) 1,000 units tablet Take 1,000 Units by mouth daily Take 2 tabs (2000mg) PO daily     • Cyanocobalamin (Vitamin B 12) 500 MCG TABS Take 1,000 mcg by mouth Take 2 tabs (1000mg) PO daily     • ergocalciferol (VITAMIN D2) 50,000 units Take 1 capsule (50,000 Units total) by mouth once a week 12 capsule 0   • famotidine (PEPCID) 40 MG tablet take 1 tablet by mouth once daily 20 tablet 2   • FLUoxetine (PROzac) 10 mg capsule take 1 capsule by mouth once daily 30 capsule 1     No current facility-administered medications for this visit  Allergies   Allergen Reactions   • Sulfa Antibiotics Other (See Comments)     Family history ngo jermaine syndrone   • Amoxicillin-Pot Clavulanate Diarrhea   • M[employer] Fatigue       Review of Systems  Constitutional: Negative  Negative for appetite change and fever  HENT: Negative  Negative for hearing loss, tinnitus, trouble swallowing and voice change  Eyes: Negative    Negative for photophobia, pain and visual disturbance  Respiratory: Negative  Negative for shortness of breath  Cardiovascular: Negative  Negative for palpitations  Gastrointestinal: Negative  Negative for nausea and vomiting  Endocrine: Negative  Negative for cold intolerance  Genitourinary: Negative  Negative for dysuria, frequency and urgency  Musculoskeletal: Negative  Negative for gait problem, myalgias and neck pain  Skin: Negative  Negative for rash  Allergic/Immunologic: Negative  Neurological: Positive for tremors and headaches  Negative for dizziness, seizures, syncope, facial asymmetry, speech difficulty, weakness, light-headedness and numbness  Hematological: Negative  Does not bruise/bleed easily  Psychiatric/Behavioral: Negative  Negative for confusion, hallucinations and sleep disturbance  Video Exam    Vitals:    01/16/23 1330   Weight: 68 kg (150 lb)   Height: 5' 5" (1 651 m)       Physical Exam     I spent 21 minutes with patient today in which greater than 50% of the time was spent in counseling/coordination of care regarding Pathophysiology of underlying neurological dysfunction and prognosis

## 2023-01-16 NOTE — PROGRESS NOTES
Patient ID: Cyndee Moya is a 46 y o  female  Assessment/Plan:    No problem-specific Assessment & Plan notes found for this encounter  {Assess/PlanSmartLinks:60439}       Subjective:    HPI    {[facility] HPI texts:18014}    {Common ambulatory SmartLinks:69943}         Objective:    Height 5' 5" (1 651 m), weight 68 kg (150 lb)  Physical Exam    Neurological Exam      ROS:    Review of Systems   Constitutional: Negative  Negative for appetite change and fever  HENT: Negative  Negative for hearing loss, tinnitus, trouble swallowing and voice change  Eyes: Negative  Negative for photophobia, pain and visual disturbance  Respiratory: Negative  Negative for shortness of breath  Cardiovascular: Negative  Negative for palpitations  Gastrointestinal: Negative  Negative for nausea and vomiting  Endocrine: Negative  Negative for cold intolerance  Genitourinary: Negative  Negative for dysuria, frequency and urgency  Musculoskeletal: Negative  Negative for gait problem, myalgias and neck pain  Skin: Negative  Negative for rash  Allergic/Immunologic: Negative  Neurological: Positive for tremors and headaches  Negative for dizziness, seizures, syncope, facial asymmetry, speech difficulty, weakness, light-headedness and numbness  Hematological: Negative  Does not bruise/bleed easily  Psychiatric/Behavioral: Negative  Negative for confusion, hallucinations and sleep disturbance

## 2023-01-16 NOTE — PROGRESS NOTES
Patient ID: Cary Kendrick is a 46 y o  female  Assessment/Plan:    No problem-specific Assessment & Plan notes found for this encounter  {Assess/PlanSmartLinks:12474}       Subjective:    HPI    {[facility] HPI texts:35944}    {Common ambulatory SmartLinks:99926}         Objective:    Height 5' 5" (1 651 m), weight 68 kg (150 lb)      Physical Exam    Neurological Exam      ROS:    Review of Systems

## 2023-01-30 ENCOUNTER — TELEPHONE (OUTPATIENT)
Dept: NEUROLOGY | Facility: CLINIC | Age: 53
End: 2023-01-30

## 2023-01-30 DIAGNOSIS — G35 MS (MULTIPLE SCLEROSIS) (HCC): Primary | ICD-10-CM

## 2023-01-30 NOTE — TELEPHONE ENCOUNTER
Per chart review, Aubagio start form was submitted for 7mg strength quantity of 30 with refills for 14 mg after that  Pt made aware, she verbalizes understanding and is agreeable

## 2023-01-30 NOTE — TELEPHONE ENCOUNTER
Hi, my name is [email or web address]  I'm a patient of Dr Reinier Colón  My YOB: 1970  The doctor had prescribed me Aubagio 7 milligrams  And I did have an appointment this january and I believe that she was keeping me on 7 milligrams, not moving me to 14 milligrams  When I called the pharmacy to refill the the script, they only have 14 milligrams  They don't have a script for the 7 milligrams, I'm not sure if I misunderstood her or if the script just never was sent to the pharmacy  I am going to be out of the medicine  tomorrow or the next day  So if someone could call me back and let me know, that would be wonderful  [phone]  Thank you  Called pt and made aware that I receive her message and will send it to Dr Reinier Colón  Pt is requesting a cb today     Cb [phone], ok to leave detailed message

## 2023-01-30 NOTE — TELEPHONE ENCOUNTER
There are no new orders been placed since initial submission of start form  Based on our visit note - patient is to consider serologic work up on 2/2/2023 and based on her liver findings, Aubagio dose might be adjusted  Patient may safely take Aubagio 14 mg 1/2 tablet till serologic work up and if no liver dysfunction noted, transition to a full dose Aubagio 14 mg 1 tab daily   Only in circumstances where patient believes sensory dysfunction in lower extremities worsen - then we will be decreasing Aubagio 14 mg to 7 mg or 1/2 tab

## 2023-02-01 RX ORDER — RENAGEL 800 MG/1
14 TABLET ORAL DAILY
Qty: 30 TABLET | Refills: 11 | Status: SHIPPED | OUTPATIENT
Start: 2023-02-01

## 2023-02-01 NOTE — TELEPHONE ENCOUNTER
Pt left message  I called earlier this week about prescription for my Aubagio,and I did call my pharmacy,and here my insurance denied it ,apperantly my insurance changed pharmacy in January ,so I have to start over with a different specialty pharmacy  I do have the fax # that script to be send to   [phone] of the pharmacy is [facility]   But my questions is apparently will take a while for this medication to come to me ,they made it sound like it will  be  Sometime next week    I am wondering if that is a concern that I will be out of medication for a week since I just started medication last month  If someone call me back  [phone] to leave detailed message      Called pt back and made her aware that we got her message and will be sending it to the appropriate team

## 2023-02-01 NOTE — TELEPHONE ENCOUNTER
Per below notes, pt to use Aubagio 14mg tabs at this time  Script pended for signature to be sent to [facility]  Left detailed message for pt advising goal is to get her back on medication asap if she runs out  Eudora Scheuermann will remain in her system for a long time after stopping  Please review and sign Rx if agreeable

## 2023-02-02 ENCOUNTER — APPOINTMENT (OUTPATIENT)
Dept: LAB | Facility: CLINIC | Age: 53
End: 2023-02-02

## 2023-02-02 LAB
ALBUMIN SERPL BCP-MCNC: 3.8 G/DL (ref 3.5–5)
ALP SERPL-CCNC: 56 U/L (ref 46–116)
ALT SERPL W P-5'-P-CCNC: 30 U/L (ref 12–78)
AST SERPL W P-5'-P-CCNC: 17 U/L (ref 5–45)
BILIRUB DIRECT SERPL-MCNC: <0.05 MG/DL (ref 0–0.2)
BILIRUB SERPL-MCNC: 0.45 MG/DL (ref 0.2–1)
PROT SERPL-MCNC: 7.2 G/DL (ref 6.4–8.4)

## 2023-02-09 DIAGNOSIS — R29.898 RIGHT ARM WEAKNESS: ICD-10-CM

## 2023-02-09 DIAGNOSIS — G37.9 CNS DEMYELINATION (HCC): ICD-10-CM

## 2023-02-09 DIAGNOSIS — F43.22 ADJUSTMENT DISORDER WITH ANXIOUS MOOD: ICD-10-CM

## 2023-02-09 RX ORDER — FLUOXETINE 10 MG/1
CAPSULE ORAL
Qty: 30 CAPSULE | Refills: 1 | Status: SHIPPED | OUTPATIENT
Start: 2023-02-09

## 2023-02-10 NOTE — TELEPHONE ENCOUNTER
February 8, 2023       SARAH    7:14 AM  Lluvia Garcias routed this conversation to Neurology [facility] Clinical Team 3   February 7, 2023  Candida Eastern  to [facility] (supporting Magdaleno Olsen MD)    MB    7:02 PM  Hi I was wondering if my blood work was [facility]  And should I begin taking 14 mg?   Thank you,  Dameon Ashley

## 2023-02-10 NOTE — TELEPHONE ENCOUNTER
Hepatic function lab results WNL and reviewed by provider  Per Dr Kimberly Singletary note below:    "Patient may safely take Aubagio 14 mg 1/2 tablet till serologic work up and if no liver dysfunction noted, transition to a full dose Aubagio 14 mg 1 tab daily "    Advised pt via MyChart that lab results are normal and she may begin taking Aubagio 14mg       Dr Kimberly Singletary - [facility]

## 2023-02-24 ENCOUNTER — TELEPHONE (OUTPATIENT)
Dept: NEUROLOGY | Facility: CLINIC | Age: 53
End: 2023-02-24

## 2023-02-24 NOTE — TELEPHONE ENCOUNTER
I would recommend she continue to monitor symptoms for now  Especially if episode was nearly 1 week ago and has not reoccurred  It may be related to her recent increase in stress  I am sorry to hear about her mother

## 2023-02-24 NOTE — TELEPHONE ENCOUNTER
Pt left message    told me if any of my symptoms flared up for 24 hours to give her a call,and they are flaring up and not for 24 hour straight,I have a lot going on right now  My mother is critically ill,and they not believe she is going to make it  But the trembling in my legs was really bad on Sunday and it comes and goes, I woke up 2 night on the row now with trembling feeling   So I just wasn't sure if there is something I should be doing  I will appreciate a call back  CB# [phone]    Called pt back to get more information    Pt reported that this past Sunday while at [employer] she had  10 min episode  of feeling trembling and some weakness of both legs     New numbness/tingling? No      New Weakness/muscle spasms? No    Bowel/Bladder Changes? No    UTI Sx? (burning, itching, painful urination, retention, urgency etc ) No    Vision changes? denies  Recent illness (cough, cold, chills, fever, sinus infection, URI, UTI, etc )? No     Aubagio 14 mg daily started beginning of  February      New or worsening fatigue? No    Date/Type of Last MRI (brain/Tspine/Cspine)?- brain-9/29/22  Spine done on 10/21/22    History of IV steroids? Yes, last year in November ,reported that she tolerated them well     Diabetes or psych Hx -denies  Increase in stress?  Yes   if so what is the new stressor- mother is ill   Denies any drug use    CB#[phone]- ok to leave detailed message   Please advise

## 2023-02-28 DIAGNOSIS — G35 MS (MULTIPLE SCLEROSIS) (HCC): Primary | ICD-10-CM

## 2023-02-28 RX ORDER — SODIUM CHLORIDE 9 MG/ML
20 INJECTION, SOLUTION INTRAVENOUS ONCE
OUTPATIENT
Start: 2023-03-02

## 2023-02-28 RX ORDER — FAMOTIDINE 40 MG/1
40 TABLET, FILM COATED ORAL DAILY
Qty: 14 TABLET | Refills: 0 | Status: SHIPPED | OUTPATIENT
Start: 2023-02-28

## 2023-02-28 RX ORDER — TEMAZEPAM 15 MG/1
15 CAPSULE ORAL
Qty: 10 CAPSULE | Refills: 0 | Status: SHIPPED | OUTPATIENT
Start: 2023-02-28

## 2023-02-28 NOTE — TELEPHONE ENCOUNTER
I would bring concern for MS relapse in the setting of just starting Aubagio and family stress    If patient tolerated Solumedrol well before, I will offer Solumedrol 1000 mg IV x 2 doses ( can be 1-2 days apart) and support with temazepam and pepcid

## 2023-02-28 NOTE — TELEPHONE ENCOUNTER
Hi, this is Leodan Smith, date of birth, 1970  I had called on Friday and I did speak with someone they were going to contact Dr Octavia Benson about my concerns  I haven't heard back and I was just just following up  thank you  Cb [phone]    Called pt and advised of the below  She verbalized understanding  Pt states that her symptoms reoccurred  It might be due to increased in stress per pt  She just wanted to know if she should be doing anything different since her symptoms reoccurred

## 2023-02-28 NOTE — TELEPHONE ENCOUNTER
Spoke with pt, she confirmed she tolerated Solumedrol infusions well in the past  Previously went to [facility] infusion center  However, pt's mother is located at [facility]- pt is going there every day  Advised [facility] infusion center would be closest if she is interested in staying in [facility] area  Pt would like to think about infusions and call back  Notes she was told her mother may only have a few days to live  Advised pt to call back with her decision and we would be happy to help  Per [employer] , [id number], and [id number] do not require PA  Therapy plan entered and sent for signature  Will hold off on scheduling until we hear back from pt

## 2023-03-02 ENCOUNTER — APPOINTMENT (OUTPATIENT)
Dept: LAB | Facility: MEDICAL CENTER | Age: 53
End: 2023-03-02

## 2023-03-02 ENCOUNTER — TELEPHONE (OUTPATIENT)
Dept: NEUROLOGY | Facility: CLINIC | Age: 53
End: 2023-03-02

## 2023-03-02 NOTE — TELEPHONE ENCOUNTER
Received [facility], this is Shen hays date of birth  1970  I'm a patient of Dr Chay Nagel  I was wondering if doctor could prescribed something for my anxiety  I have the situation going on with my mother  We recently put her in hospice  I'm getting mild, panic attacks and I didn't know  if I could safely take something or if I'd have to call my primary care doctor  But I didn't know if I could be prescribed something like an ativan or something like that  Okay  If you can call me back, my number is [phone]   Thank you very much   -------------------------------------------------------  Left detailed message per communication consent advising that I would send message to dr Chay Nagel but also advised that she contact her pcp    Please advise

## 2023-03-02 NOTE — TELEPHONE ENCOUNTER
Unfortunately, we do not prescribe benzodiazepines as we do not run urine drug tests in our office for controlled substances    I would support the patient is to follow with a primary team

## 2023-03-02 NOTE — TELEPHONE ENCOUNTER
Pt contacted our office today asking for anxiety medication  See new encounter  Pt already aware to advise our office when she is ready to move forward with Solumedrol infusions

## 2023-03-13 ENCOUNTER — HOSPITAL ENCOUNTER (OUTPATIENT)
Dept: INFUSION CENTER | Facility: HOSPITAL | Age: 53
Discharge: HOME | End: 2023-03-13
Attending: PSYCHIATRY & NEUROLOGY

## 2023-03-13 VITALS
RESPIRATION RATE: 16 BRPM | SYSTOLIC BLOOD PRESSURE: 118 MMHG | HEART RATE: 88 BPM | DIASTOLIC BLOOD PRESSURE: 68 MMHG | TEMPERATURE: 97.8 F

## 2023-03-13 DIAGNOSIS — G35 MS (MULTIPLE SCLEROSIS) (HCC): Primary | ICD-10-CM

## 2023-03-13 RX ORDER — SODIUM CHLORIDE 9 MG/ML
20 INJECTION, SOLUTION INTRAVENOUS ONCE
Status: CANCELLED | OUTPATIENT
Start: 2023-03-14

## 2023-03-13 RX ORDER — SODIUM CHLORIDE 9 MG/ML
20 INJECTION, SOLUTION INTRAVENOUS ONCE
Status: COMPLETED | OUTPATIENT
Start: 2023-03-13 | End: 2023-03-13

## 2023-03-13 RX ORDER — SODIUM CHLORIDE 9 MG/ML
20 INJECTION, SOLUTION INTRAVENOUS ONCE
Status: CANCELLED | OUTPATIENT
Start: 2023-03-13

## 2023-03-13 RX ADMIN — SODIUM CHLORIDE 1000 MG: 0.9 INJECTION, SOLUTION INTRAVENOUS at 13:19

## 2023-03-13 RX ADMIN — SODIUM CHLORIDE 20 ML/HR: 0.9 INJECTION, SOLUTION INTRAVENOUS at 12:00

## 2023-03-13 NOTE — TELEPHONE ENCOUNTER
Patient showed up at [address] today  She did not have an appt  Patient was to contact our office if/when she wanted to proceed with Solumedrol IV x 2  [facility] is able to accommodate pt   for infusion today  Martha PAUL confirmed patient has not had any recent illness or infection and is currently afebril  Therapy plan released from hold status  Start date updated  Plan sent for signature

## 2023-03-14 ENCOUNTER — HOSPITAL ENCOUNTER (OUTPATIENT)
Dept: INFUSION CENTER | Facility: HOSPITAL | Age: 53
Discharge: HOME | End: 2023-03-14
Attending: PSYCHIATRY & NEUROLOGY

## 2023-03-15 ENCOUNTER — HOSPITAL ENCOUNTER (OUTPATIENT)
Dept: INFUSION CENTER | Facility: HOSPITAL | Age: 53
Discharge: HOME | End: 2023-03-15
Attending: PSYCHIATRY & NEUROLOGY

## 2023-03-15 VITALS
DIASTOLIC BLOOD PRESSURE: 57 MMHG | TEMPERATURE: 94.4 F | RESPIRATION RATE: 18 BRPM | HEART RATE: 96 BPM | SYSTOLIC BLOOD PRESSURE: 104 MMHG | OXYGEN SATURATION: 94 %

## 2023-03-15 DIAGNOSIS — G35 MS (MULTIPLE SCLEROSIS) (HCC): Primary | ICD-10-CM

## 2023-03-15 RX ORDER — SODIUM CHLORIDE 9 MG/ML
20 INJECTION, SOLUTION INTRAVENOUS ONCE
Status: CANCELLED | OUTPATIENT
Start: 2023-03-15

## 2023-03-15 RX ORDER — SODIUM CHLORIDE 9 MG/ML
20 INJECTION, SOLUTION INTRAVENOUS ONCE
Status: COMPLETED | OUTPATIENT
Start: 2023-03-15 | End: 2023-03-15

## 2023-03-15 RX ADMIN — SODIUM CHLORIDE 20 ML/HR: 0.9 INJECTION, SOLUTION INTRAVENOUS at 14:15

## 2023-03-15 RX ADMIN — SODIUM CHLORIDE 1000 MG: 0.9 INJECTION, SOLUTION INTRAVENOUS at 14:15

## 2023-04-03 ENCOUNTER — APPOINTMENT (OUTPATIENT)
Dept: LAB | Facility: CLINIC | Age: 53
End: 2023-04-03

## 2023-04-03 DIAGNOSIS — G35 MS (MULTIPLE SCLEROSIS) (HCC): ICD-10-CM

## 2023-04-04 RX ORDER — ERGOCALCIFEROL 1.25 MG/1
CAPSULE ORAL
Qty: 12 CAPSULE | Refills: 0 | Status: SHIPPED | OUTPATIENT
Start: 2023-04-04

## 2023-05-01 ENCOUNTER — APPOINTMENT (OUTPATIENT)
Dept: LAB | Facility: CLINIC | Age: 53
End: 2023-05-01

## 2023-05-06 ENCOUNTER — OFFICE VISIT (OUTPATIENT)
Dept: URGENT CARE | Facility: CLINIC | Age: 53
End: 2023-05-06

## 2023-05-06 ENCOUNTER — APPOINTMENT (OUTPATIENT)
Dept: RADIOLOGY | Facility: CLINIC | Age: 53
End: 2023-05-06

## 2023-05-06 VITALS
RESPIRATION RATE: 18 BRPM | HEART RATE: 99 BPM | SYSTOLIC BLOOD PRESSURE: 121 MMHG | DIASTOLIC BLOOD PRESSURE: 84 MMHG | TEMPERATURE: 98.3 F | OXYGEN SATURATION: 97 %

## 2023-05-06 DIAGNOSIS — R05.1 ACUTE COUGH: Primary | ICD-10-CM

## 2023-05-06 DIAGNOSIS — R05.1 ACUTE COUGH: ICD-10-CM

## 2023-05-06 DIAGNOSIS — J06.9 ACUTE URI: ICD-10-CM

## 2023-05-06 DIAGNOSIS — J02.9 ACUTE PHARYNGITIS, UNSPECIFIED ETIOLOGY: ICD-10-CM

## 2023-05-06 LAB
SARS-COV-2 AG UPPER RESP QL IA: NEGATIVE
VALID CONTROL: NORMAL

## 2023-05-06 RX ORDER — BENZONATATE 100 MG/1
100 CAPSULE ORAL 3 TIMES DAILY PRN
Qty: 20 CAPSULE | Refills: 0 | Status: SHIPPED | OUTPATIENT
Start: 2023-05-06

## 2023-05-06 RX ORDER — AZITHROMYCIN 250 MG/1
TABLET, FILM COATED ORAL
Qty: 6 TABLET | Refills: 0 | Status: SHIPPED | OUTPATIENT
Start: 2023-05-06 | End: 2023-05-10

## 2023-05-06 RX ORDER — FLUTICASONE PROPIONATE 50 MCG
2 SPRAY, SUSPENSION (ML) NASAL DAILY
Qty: 11.1 ML | Refills: 0 | Status: SHIPPED | OUTPATIENT
Start: 2023-05-06

## 2023-05-06 NOTE — PROGRESS NOTES
[facility]        NAME: Linh Kaye is a 46 y o  female  : 1970    MRN: [id number]  DATE: May 6, 2023  TIME: 3:08 PM    Assessment and Plan   Acute cough [R05 1]  1  Acute cough  Poct Covid 19 Rapid Antigen Test    XR chest pa & lateral    azithromycin (ZITHROMAX) 250 mg tablet    benzonatate (TESSALON PERLES) 100 mg capsule      2  Acute URI  azithromycin (ZITHROMAX) 250 mg tablet    fluticasone (FLONASE) 50 mcg/act nasal spray      3  Acute pharyngitis, unspecified etiology          Rapid COVID test negative  No acute finding on x-ray  Official radiology read pending at this time  Will reach out to patient tomorrow when official read is completed if there is any abnormal findings  Sending in [employer], [employer], and Flonase  Advised to take prescription medication as instructed  Advised to follow-up with her family doctor if no improvement  Advised to return or go to the ER symptoms worsen  Patient Instructions     Take prescribed medication as instructed  Tylenol or ibuprofen for any pain or fever  May continue with daily allergy medication as instructed by her family doctor  May try cool-mist humidifier in your bedroom at night  May try nasal saline flushes or [place] pot  May try warm tea with honey, teaspoon of honey, or throat lozenges for sore throat relief  If no improvement, follow-up with your family doctor  If symptoms suddenly worsen (such as shortness of breath or if you develop chest pain), go to the emergency room  Follow up with PCP in 3-5 days  Proceed to  ER if symptoms worsen      Chief Complaint     Chief Complaint   Patient presents with    Fatigue    Cough     Onset of symptoms 23 also with mid back pain all symptoms preceded by a sore throat that she denies presently last covid on 23 was negative         History of Present Illness       60-year-old female with past medical history of MS and CNS demyelination presents with an initial sore throat -which she states has not developed into a cough, chest congestion, fatigue, and some body aches  Symptoms started on 5/1  PT took some DayQuil/NyQuil earlier last week, however has stopped this medication  PT started taking her allergy medication on the initial day of symptoms  PT states body aches are primarily in the upper back without any injury or fall  PT currently takes Aubagio 14mg daily for MS  Admits to some congestion/pressure in the face  Denies any fever, chills, chest pain, shortness of breath, abdominal pain, nausea, vomiting, diarrhea  Denies any history of pneumonia, asthma, COPD  PT had a negative COVID test on 5/2  Review of Systems   Review of Systems   Constitutional: Positive for fatigue  Negative for chills and fever  HENT: Positive for congestion, sinus pressure and sore throat  Negative for ear discharge, ear pain, postnasal drip, rhinorrhea and sinus pain  Eyes: Negative  Respiratory: Positive for cough  Negative for shortness of breath and wheezing  Cardiovascular: Negative  Gastrointestinal: Negative  Musculoskeletal: Positive for back pain and myalgias  Skin: Negative  Neurological: Negative            Current Medications       Current Outpatient Medications:     Aubagio 14 MG TABS, Take 1 tablet (14 mg total) by mouth daily, Disp: 30 tablet, Rfl: 11    azithromycin (ZITHROMAX) 250 mg tablet, Take 2 tablets today then 1 tablet daily x 4 days, Disp: 6 tablet, Rfl: 0    benzonatate (TESSALON PERLES) 100 mg capsule, Take 1 capsule (100 mg total) by mouth 3 (three) times a day as needed for cough, Disp: 20 capsule, Rfl: 0    cholecalciferol (VITAMIN D3) 1,000 units tablet, Take 1,000 Units by mouth daily Take 2 tabs (2000mg) PO daily, Disp: , Rfl:     Cyanocobalamin (Vitamin B 12) 500 MCG TABS, Take 1,000 mcg by mouth Take 2 tabs (1000mg) PO daily, Disp: , Rfl:     famotidine (PEPCID) 40 MG tablet, take 1 tablet by mouth once daily, Disp: 20 tablet, Rfl: 2    FLUoxetine (PROzac) 10 mg capsule, take 1 capsule by mouth once daily, Disp: 30 capsule, Rfl: 1    fluticasone (FLONASE) 50 mcg/act nasal spray, 2 sprays into each nostril daily, Disp: 11 1 mL, Rfl: 0    ergocalciferol (VITAMIN D2) 50,000 units, take 1 capsule by mouth every week (Patient not taking: Reported on 5/6/2023), Disp: 12 capsule, Rfl: 0    famotidine (PEPCID) 40 MG tablet, Take 1 tablet (40 mg total) by mouth daily (Patient not taking: Reported on 5/6/2023), Disp: 14 tablet, Rfl: 0    temazepam (RESTORIL) 15 mg capsule, Take 1 capsule (15 mg total) by mouth daily at bedtime, Disp: 10 capsule, Rfl: 0    Current Allergies     Allergies as of 05/06/2023 - Reviewed 05/06/2023   Allergen Reaction Noted    Sulfa antibiotics Other (See Comments) 10/10/2022    Amoxicillin-pot clavulanate Diarrhea 01/13/2022    Molds & smuts Fatigue 11/15/2021            The following portions of the patient's history were reviewed and updated as appropriate: allergies, current medications, past family history, past medical history, past social history, past surgical history and problem list      No past medical history on file  No past surgical history on file  No family history on file  Medications have been verified  Objective   /84   Pulse 99   Temp 98 3 °F (36 8 °C)   Resp 18   SpO2 97%        Physical Exam     Physical Exam  Constitutional:       General: She is not in acute distress  Appearance: Normal appearance  She is not ill-appearing  HENT:      Head: Normocephalic and atraumatic  Right Ear: Tympanic membrane, ear canal and external ear normal       Left Ear: Tympanic membrane, ear canal and external ear normal       Nose: Nose normal       Right Sinus: No maxillary sinus tenderness or frontal sinus tenderness  Left Sinus: No maxillary sinus tenderness or frontal sinus tenderness  Mouth/Throat:      Lips: Pink        Mouth: Mucous membranes are moist       Pharynx: Oropharynx is clear  Uvula midline  Posterior oropharyngeal erythema (mild) present  No pharyngeal swelling, oropharyngeal exudate or uvula swelling  Tonsils: No tonsillar exudate or tonsillar abscesses  0 on the right  0 on the left  Eyes:      Extraocular Movements: Extraocular movements intact  Conjunctiva/sclera: Conjunctivae normal       Pupils: Pupils are equal, round, and reactive to light  Cardiovascular:      Rate and Rhythm: Normal rate and regular rhythm  Pulses: Normal pulses  Heart sounds: Normal heart sounds  No murmur heard  No friction rub  No gallop  Pulmonary:      Effort: Pulmonary effort is normal  No respiratory distress  Breath sounds: Normal breath sounds  No stridor  No wheezing, rhonchi or rales  Chest:      Chest wall: No tenderness  Musculoskeletal:         General: No swelling, tenderness, deformity or signs of injury  Normal range of motion  Cervical back: Normal range of motion and neck supple  No tenderness  Lymphadenopathy:      Cervical: No cervical adenopathy  Skin:     General: Skin is warm and dry  Capillary Refill: Capillary refill takes less than 2 seconds  Findings: No rash  Neurological:      General: No focal deficit present  Mental Status: She is alert and oriented to person, place, and time  Mental status is at baseline     Psychiatric:         Mood and Affect: Mood normal          Behavior: Behavior normal

## 2023-05-06 NOTE — PATIENT INSTRUCTIONS
Take prescribed medication as instructed  Tylenol or ibuprofen for any pain or fever  May continue with daily allergy medication as instructed by her family doctor  May try cool-mist humidifier in your bedroom at night  May try nasal saline flushes or [place] pot  May try warm tea with honey, teaspoon of honey, or throat lozenges for sore throat relief  If no improvement, follow-up with your family doctor  If symptoms suddenly worsen (such as shortness of breath or if you develop chest pain), go to the emergency room  Follow up with PCP in 3-5 days  Proceed to  ER if symptoms worsen  Pharyngitis   WHAT YOU NEED TO KNOW:   Pharyngitis, or sore throat, is inflammation of the tissues and structures in your pharynx (throat)  Pharyngitis is most often caused by bacteria or a virus  Other causes include smoking, allergies, or acid reflux  DISCHARGE INSTRUCTIONS:   Call your local emergency number (911 in the [address] [place] [address],[address]) if:   You have trouble breathing or swallowing because your throat is swollen  Return to the emergency department if:   You are drooling because it hurts too much to swallow  Your fever is higher than 102? F (39?C) or lasts longer than 3 days  You are confused  You taste blood  Call your doctor if:   Your throat pain gets worse  You have a painful lump in your throat that does not go away after 5 days  Your symptoms do not improve after 5 days  You have questions or concerns about your condition or care  Medicines:  Viral pharyngitis will go away on its own without treatment  Your sore throat should start to feel better in 3 to 5 days  You may need any of the following:  Antibiotics  treat a bacterial infection  NSAIDs  help decrease swelling and pain or fever  This medicine is available with or without a doctor's order  NSAIDs can cause stomach bleeding or kidney problems in certain people   If you take blood thinner medicine, always ask your healthcare provider if NSAIDs are safe for you  Always read the medicine label and follow directions  Acetaminophen  decreases pain and fever  It is available without a doctor's order  Ask how much to take and how often to take it  Follow directions  Read the labels of all other medicines you are using to see if they also contain acetaminophen, or ask your doctor or pharmacist  Acetaminophen can cause liver damage if not taken correctly  Take your medicine as directed  Contact your healthcare provider if you think your medicine is not helping or if you have side effects  Tell your provider if you are allergic to any medicine  Keep a list of the medicines, vitamins, and herbs you take  Include the amounts, and when and why you take them  Bring the list or the pill bottles to follow-up visits  Carry your medicine list with you in case of an emergency  Manage your symptoms:   Gargle salt water  Mix ¼ teaspoon salt in an 8 ounce glass of warm water and gargle  Do not swallow  Salt water may help decrease swelling in your throat  Drink liquids as directed  You may need to drink more liquids than usual  Liquids may help soothe your throat and prevent dehydration  Ask how much liquid to drink each day and which liquids are best for you  Use a cool mist humidifier  This will add moisture to the air and help decrease your cough  Soothe your throat  Cough drops, ice, soft foods, or popsicles may help decrease throat pain  Prevent the spread of pharyngitis:  Cover your mouth and nose when you cough or sneeze  Do not share food or drinks  Wash your hands often  Use soap and water  If soap and water are unavailable, use an alcohol-based hand   Follow up with your doctor as directed:  Write down your questions so you remember to ask them during your visits  © Copyright [employer] 2022 Information is for End User's use only and may not be sold, redistributed or otherwise used for commercial purposes    The above information is an  only  It is not intended as medical advice for individual conditions or treatments  Talk to your doctor, nurse or pharmacist before following any medical regimen to see if it is safe and effective for you  Acute Cough   WHAT YOU NEED TO KNOW:   An acute cough can last up to 3 weeks  Common causes of an acute cough include a cold, allergies, or a lung infection  DISCHARGE INSTRUCTIONS:   Return to the emergency department if:   You have trouble breathing or feel short of breath  You cough up blood, or you see blood in your mucus  You faint or feel weak or dizzy  You have chest pain when you cough or take a deep breath  You have new wheezing  Contact your healthcare provider if:   You have a fever  Your cough lasts longer than 4 weeks  Your symptoms do not improve with treatment  You have questions or concerns about your condition or care  Medicines:   Medicines  may be needed to stop the cough, decrease swelling in your airways, or help open your airways  Medicine may also be given to help you cough up mucus  Ask your healthcare provider what over-the-counter medicines you can take  If you have an infection caused by bacteria, you may need antibiotics  Take your medicine as directed  Contact your healthcare provider if you think your medicine is not helping or if you have side effects  Tell your provider if you are allergic to any medicine  Keep a list of the medicines, vitamins, and herbs you take  Include the amounts, and when and why you take them  Bring the list or the pill bottles to follow-up visits  Carry your medicine list with you in case of an emergency  Manage your symptoms:   Do not smoke and stay away from others who smoke  Nicotine and other chemicals in cigarettes and cigars can cause lung damage and make your cough worse  Ask your healthcare provider for information if you currently smoke and need help to quit   E-cigarettes or smokeless tobacco still contain nicotine  Talk to your healthcare provider before you use these products  Drink extra liquids as directed  Liquids will help thin and loosen mucus so you can cough it up  Liquids will also help prevent dehydration  Examples of good liquids to drink include water, fruit juice, and broth  Do not drink liquids that contain caffeine  Caffeine can increase your risk for dehydration  Ask your healthcare provider how much liquid to drink each day  Rest as directed  Do not do activities that make your cough worse, such as exercise  Use a humidifier or vaporizer  Use a cool mist humidifier or a vaporizer to increase air moisture in your home  This may make it easier for you to breathe and help decrease your cough  Eat 2 to 5 mL of honey 2 times each day  Honey can help thin mucus and decrease your cough  Use cough drops or lozenges  These can help decrease throat irritation and your cough  Follow up with your healthcare provider as directed:  Write down your questions so you remember to ask them during your visits  © Copyright [employer] 2022 Information is for End User's use only and may not be sold, redistributed or otherwise used for commercial purposes  The above information is an  only  It is not intended as medical advice for individual conditions or treatments  Talk to your doctor, nurse or pharmacist before following any medical regimen to see if it is safe and effective for you  Upper Respiratory Infection   WHAT YOU NEED TO KNOW:   An upper respiratory infection is also called a cold  It can affect your nose, throat, ears, and sinuses  Cold symptoms are usually worst for the first 3 to 5 days  Most people get better in 7 to 14 days  You may continue to cough for 2 to 3 weeks  Colds are caused by viruses and do not get better with antibiotics    DISCHARGE INSTRUCTIONS:   Call your local emergency number (666 in the [address] [place] [address],[address]) if:   You have chest pain or trouble breathing  Return to the emergency department if:   You have a fever over 102ºF (39ºC)  Call your doctor if:   You have a low fever  Your sore throat gets worse or you see white or yellow spots in your throat  Your symptoms get worse after 3 to 5 days or are not better in 14 days  You have a rash anywhere on your skin  You have large, tender lumps in your neck  You have thick, green, or yellow drainage from your nose  You cough up thick yellow, green, or bloody mucus  You have a bad earache  You have questions or concerns about your condition or care  Medicines: You may need any of the following:  Decongestants  help reduce nasal congestion and help you breathe more easily  If you take decongestant pills, they may make you feel restless or cause problems with your sleep  Do not use decongestant sprays for more than a few days  Cough suppressants  help reduce coughing  Ask your healthcare provider which type of cough medicine is best for you  NSAIDs , such as ibuprofen, help decrease swelling, pain, and fever  NSAIDs can cause stomach bleeding or kidney problems in certain people  If you take blood thinner medicine, always ask your healthcare provider if NSAIDs are safe for you  Always read the medicine label and follow directions  Acetaminophen  decreases pain and fever  It is available without a doctor's order  Ask how much to take and how often to take it  Follow directions  Read the labels of all other medicines you are using to see if they also contain acetaminophen, or ask your doctor or pharmacist  Acetaminophen can cause liver damage if not taken correctly  Take your medicine as directed  Contact your healthcare provider if you think your medicine is not helping or if you have side effects  Tell your provider if you are allergic to any medicine  Keep a list of the medicines, vitamins, and herbs you take   Include the amounts, and when and why you take them  Bring the list or the pill bottles to follow-up visits  Carry your medicine list with you in case of an emergency  Self-care:   Rest as much as possible  Slowly start to do more each day  Drink more liquids as directed  Liquids will help thin and loosen mucus so you can cough it up  Liquids will also help prevent dehydration  Liquids that help prevent dehydration include water, fruit juice, and broth  Do not drink liquids that contain caffeine  Caffeine can increase your risk for dehydration  Ask your healthcare provider how much liquid to drink each day  Soothe a sore throat  Gargle with warm salt water  Make salt water by dissolving ¼ teaspoon salt in 1 cup warm water  You may also suck on hard candy or throat lozenges  You may use a sore throat spray  Use a humidifier or vaporizer  Use a cool mist humidifier or a vaporizer to increase air moisture in your home  This may make it easier for you to breathe and help decrease your cough  Use saline nasal drops as directed  These help relieve congestion  Apply petroleum-based jelly around the outside of your nostrils  This can decrease irritation from blowing your nose  Do not smoke  Nicotine and other chemicals in cigarettes and cigars can make your symptoms worse  They can also cause infections such as bronchitis or pneumonia  Ask your healthcare provider for information if you currently smoke and need help to quit  E-cigarettes or smokeless tobacco still contain nicotine  Talk to your healthcare provider before you use these products  Prevent a cold: Wash your hands often  Use soap and water every time you wash your hands  Rub your soapy hands together, lacing your fingers  Use the fingers of one hand to scrub under the nails of the other hand  Wash for at least 20 seconds  Rinse with warm, running water for several seconds  Then dry your hands  Use hand  gel if soap and water are not available   Do not touch your eyes or mouth without washing your hands first          Cover a sneeze or cough  Use a tissue that covers your mouth and nose  Put the used tissue in the trash right away  Use the bend of your arm if a tissue is not available  Wash your hands well with soap and water or use a hand   Do not stand close to anyone who is sneezing or coughing  Try to stay away from others while you are sick  This is especially important during the first 2 to 3 days when the virus is more easily spread  Wait until a fever, cough, or other symptoms are gone before you return to work or other regular activities  Do not share items while you are sick  This includes food, drinks, eating utensils, and dishes  Follow up with your doctor as directed:  Write down your questions so you remember to ask them during your visits  © Copyright [employer] 2022 Information is for End User's use only and may not be sold, redistributed or otherwise used for commercial purposes  The above information is an  only  It is not intended as medical advice for individual conditions or treatments  Talk to your doctor, nurse or pharmacist before following any medical regimen to see if it is safe and effective for you

## 2023-05-17 ENCOUNTER — TELEPHONE (OUTPATIENT)
Dept: NEUROLOGY | Facility: CLINIC | Age: 53
End: 2023-05-17

## 2023-05-17 NOTE — TELEPHONE ENCOUNTER
Patient called and wanted to know if her appointment on 5/24/23 with Dr Will Maurice could be a virtual due to patient living a distance from the [place] office and Dr Mor Goodman next opening in [facility] is in July  Would a virtual be okay?

## 2023-05-23 ENCOUNTER — APPOINTMENT (RX ONLY)
Dept: URBAN - NONMETROPOLITAN AREA CLINIC 4 | Facility: CLINIC | Age: 53
Setting detail: DERMATOLOGY
End: 2023-05-23

## 2023-05-23 DIAGNOSIS — Z02.9 ENCOUNTER FOR ADMINISTRATIVE EXAMINATIONS, UNSPECIFIED: ICD-10-CM

## 2023-05-24 ENCOUNTER — TELEMEDICINE (OUTPATIENT)
Dept: NEUROLOGY | Facility: CLINIC | Age: 53
End: 2023-05-24

## 2023-05-24 ENCOUNTER — APPOINTMENT (RX ONLY)
Dept: URBAN - NONMETROPOLITAN AREA CLINIC 4 | Facility: CLINIC | Age: 53
Setting detail: DERMATOLOGY
End: 2023-05-24

## 2023-05-24 VITALS — BODY MASS INDEX: 24.99 KG/M2 | WEIGHT: 150 LBS | HEIGHT: 65 IN

## 2023-05-24 DIAGNOSIS — R29.898 RIGHT ARM WEAKNESS: ICD-10-CM

## 2023-05-24 DIAGNOSIS — D22 MELANOCYTIC NEVI: ICD-10-CM | Status: INADEQUATELY CONTROLLED

## 2023-05-24 DIAGNOSIS — R20.0 NUMBNESS OF RIGHT FOOT: ICD-10-CM

## 2023-05-24 DIAGNOSIS — G35 MS (MULTIPLE SCLEROSIS) (HCC): Primary | ICD-10-CM

## 2023-05-24 DIAGNOSIS — L57.8 OTHER SKIN CHANGES DUE TO CHRONIC EXPOSURE TO NONIONIZING RADIATION: ICD-10-CM

## 2023-05-24 DIAGNOSIS — R25.1 TREMOR: ICD-10-CM

## 2023-05-24 DIAGNOSIS — D18.0 HEMANGIOMA: ICD-10-CM

## 2023-05-24 PROBLEM — D23.71 OTHER BENIGN NEOPLASM OF SKIN OF RIGHT LOWER LIMB, INCLUDING HIP: Status: ACTIVE | Noted: 2023-05-24

## 2023-05-24 PROBLEM — D18.01 HEMANGIOMA OF SKIN AND SUBCUTANEOUS TISSUE: Status: ACTIVE | Noted: 2023-05-24

## 2023-05-24 PROBLEM — D22.5 MELANOCYTIC NEVI OF TRUNK: Status: ACTIVE | Noted: 2023-05-24

## 2023-05-24 PROBLEM — D22.61 MELANOCYTIC NEVI OF RIGHT UPPER LIMB, INCLUDING SHOULDER: Status: ACTIVE | Noted: 2023-05-24

## 2023-05-24 PROBLEM — D23.62 OTHER BENIGN NEOPLASM OF SKIN OF LEFT UPPER LIMB, INCLUDING SHOULDER: Status: ACTIVE | Noted: 2023-05-24

## 2023-05-24 PROBLEM — D23.72 OTHER BENIGN NEOPLASM OF SKIN OF LEFT LOWER LIMB, INCLUDING HIP: Status: ACTIVE | Noted: 2023-05-24

## 2023-05-24 PROBLEM — D23.61 OTHER BENIGN NEOPLASM OF SKIN OF RIGHT UPPER LIMB, INCLUDING SHOULDER: Status: ACTIVE | Noted: 2023-05-24

## 2023-05-24 PROCEDURE — ? COUNSELING

## 2023-05-24 PROCEDURE — ? PHOTO-DOCUMENTATION

## 2023-05-24 PROCEDURE — 99213 OFFICE O/P EST LOW 20 MIN: CPT

## 2023-05-24 PROCEDURE — ? DEFER

## 2023-05-24 PROCEDURE — ? SUNSCREEN RECOMMENDATIONS

## 2023-05-24 PROCEDURE — ? FULL BODY SKIN EXAM

## 2023-05-24 RX ORDER — LORATADINE 10 MG/1
CAPSULE, LIQUID FILLED ORAL DAILY
COMMUNITY

## 2023-05-24 ASSESSMENT — LOCATION DETAILED DESCRIPTION DERM
LOCATION DETAILED: RIGHT SUPERIOR CENTRAL MALAR CHEEK
LOCATION DETAILED: RIGHT INFERIOR UPPER BACK
LOCATION DETAILED: LEFT SUPERIOR MEDIAL MALAR CHEEK
LOCATION DETAILED: LEFT MEDIAL SUPERIOR CHEST
LOCATION DETAILED: LEFT CENTRAL MALAR CHEEK
LOCATION DETAILED: LEFT SUPERIOR UPPER BACK
LOCATION DETAILED: RIGHT SUPERIOR MEDIAL MALAR CHEEK
LOCATION DETAILED: RIGHT ANTERIOR SHOULDER
LOCATION DETAILED: INFERIOR MID FOREHEAD
LOCATION DETAILED: LEFT MID-UPPER BACK

## 2023-05-24 ASSESSMENT — LOCATION SIMPLE DESCRIPTION DERM
LOCATION SIMPLE: INFERIOR FOREHEAD
LOCATION SIMPLE: RIGHT SHOULDER
LOCATION SIMPLE: RIGHT CHEEK
LOCATION SIMPLE: CHEST
LOCATION SIMPLE: LEFT UPPER BACK
LOCATION SIMPLE: RIGHT UPPER BACK
LOCATION SIMPLE: LEFT CHEEK

## 2023-05-24 ASSESSMENT — LOCATION ZONE DERM
LOCATION ZONE: FACE
LOCATION ZONE: TRUNK
LOCATION ZONE: ARM

## 2023-05-24 NOTE — PROGRESS NOTES
Virtual Regular Visit    Verification of patient location: Home    Patient is located at  in the following state in which I hold an active license [place]      Assessment/Plan:    Problem List Items Addressed This Visit        Nervous and Auditory    MS (multiple sclerosis) ([facility] [address] ) - Primary       Other    Tremor    Right arm weakness    Numbness of right foot            Reason for visit is No chief complaint on file  Encounter provider Wallis Paget, MD    Provider located at [address] [facility] [id number]      Recent Visits  Date Type Provider Dept   05/24/23 Telemedicine Wallis Paget, MD [facility]   Showing recent visits within past 7 days and meeting all other requirements  Future Appointments  No visits were found meeting these conditions  Showing future appointments within next 150 days and meeting all other requirements       The patient was identified by name and date of birth  Lam Ventura was informed that this is a telemedicine visit and that the visit is being conducted through the [facility]  She agrees to proceed     My office door was closed  No one else was in the room  She acknowledged consent and understanding of privacy and security of the video platform  The patient has agreed to participate and understands they can discontinue the visit at any time  Patient is aware this is a billable service  Subjective  Lam Ventura is a 46 y o  female with MS and related issues   HPI     Mrs Wanda Simeon has presented to [place] [address] multiple sclerosis center for follow-up of multiple sclerosis related issues  On January 16, 2023, patient describes having worsening Lhermitte sign with pulsating pain comes along her spine and bending her neck  Patient had lost her mother in February and patient developed stress related to the relapse requiring steroid treatment      Patient continue taking Aubagio with side effects such as hair loss has been ongoing  Patient has changes in her GI function but no significant problems noted  Patient has no difficulties with concentration, no sleep related dysfunction  Patient believes her fatigue is more pronounced during week close to the Thursday she feels herself is swollen and achy  Patient does have some improvement in her lower extremity function with known history of demyelinating plaque at T12  Patient had completed all her radiographic testing in September - 2022, patient will be advised imaging of the brain around November - 2023, spinal cord MRIs will be offered every 2-3 years            ASSESSMENT AND PLAN    /  Oksana Turcios has presented to [place] [address] multiple sclerosis center for follow-up on multiple sclerosis and related issues  Patient describes no new focal neurological dysfunction with no side effects of Aubagio has been noted  Patient has no rash, no sensory dysfunction no joint pain  Some GI discomfort minimal has been noted  Patient completed serological work-up with no signs of liver dysfunction appreciated during her first 6 months when Aubagio 7 mg and then Aubagio 14 mg initiated  Patient responded very well to Solu-Medrol infusion at around that time patient has family stress related to the relapse-unfortunately patient's mother  during that time  Patient believes working long hours at work causing worsening fatigue especially by Thursday  Patient has been feeling achy and swollen requiring more time to recoup  Patient completed imaging of the thoracic and cervical spine in 2022 with brain imaging completed in 2022, imaging results were previously discussed  Patient will be offered follow-up of brain imaging if she describes any new focal neurological deficit or another events when patient believes she has new symptoms but nonprogressive cold symptoms    Patient is to consider following with primary care physician on scheduled basis  Has no sleep-related dysfunction  Patient is to consider decreasing vitamin D to 1000 international units daily; Patient is to continue healthy dietary approach and regular physical activity  Follow-up with [facility] neurology within 6 months    History reviewed  No pertinent past medical history  History reviewed  No pertinent surgical history  Current Outpatient Medications   Medication Sig Dispense Refill   • Aubagio 14 MG TABS Take 1 tablet (14 mg total) by mouth daily 30 tablet 11   • cholecalciferol (VITAMIN D3) 1,000 units tablet Take 1,000 Units by mouth daily Take 1tabs (1000mg) PO daily     • Cyanocobalamin (Vitamin B 12) 500 MCG TABS Take 1,000 mcg by mouth Take 2 tabs (1000mg) PO daily     • famotidine (PEPCID) 40 MG tablet take 1 tablet by mouth once daily 20 tablet 2   • FLUoxetine (PROzac) 10 mg capsule take 1 capsule by mouth once daily 30 capsule 1   • fluticasone (FLONASE) 50 mcg/act nasal spray 2 sprays into each nostril daily (Patient taking differently: 2 sprays into each nostril daily PRN) 11 1 mL 0   • Loratadine 10 MG CAPS Take by mouth in the morning       No current facility-administered medications for this visit  Allergies   Allergen Reactions   • Sulfa Antibiotics Other (See Comments)     Family history ngo jermaine syndrone   • Amoxicillin-Pot Clavulanate Diarrhea   • Molds & Smuts Fatigue       Review of Systems   Constitutional: Negative  Negative for appetite change and fever  HENT: Negative  Negative for hearing loss, tinnitus, trouble swallowing and voice change  Eyes: Negative  Negative for photophobia, pain and visual disturbance  Respiratory: Negative  Negative for shortness of breath  Cardiovascular: Negative  Negative for palpitations  Gastrointestinal: Negative  Negative for nausea and vomiting  Endocrine: Negative  Negative for cold intolerance     Genitourinary: "Negative  Negative for dysuria, frequency and urgency  Musculoskeletal: Negative  Negative for gait problem, myalgias and neck pain  Skin: Negative  Negative for rash  Allergic/Immunologic: Negative  Neurological: Negative  Negative for dizziness, tremors, seizures, syncope, facial asymmetry, speech difficulty, weakness, light-headedness, numbness and headaches  Hematological: Negative  Does not bruise/bleed easily  Psychiatric/Behavioral: Negative  Negative for confusion, hallucinations and sleep disturbance         Video Exam    Vitals:    05/24/23 1321   Weight: 68 kg (150 lb)   Height: 5' 5\" (1 651 m)       Physical Exam     Visit Time  Total Visit Duration: 21 min      "

## 2023-05-24 NOTE — HPI: EVALUATION OF SKIN LESION(S)
What Type Of Note Output Would You Prefer (Optional)?: Standard Output
Hpi Title: Evaluation of Skin Lesions
How Severe Are Your Spot(S)?: mild
Have Your Spot(S) Been Treated In The Past?: has not been treated
Family Member: Maternal Grandmother

## 2023-05-24 NOTE — PROCEDURE: DEFER
Introduction Text (Please End With A Colon): Defer:
Size Of Lesion In Cm (Optional): 0
Detail Level: Zone
Procedure To Be Performed At Next Visit: Excision
Procedure To Be Performed At Next Visit: Shave Removal

## 2023-05-24 NOTE — PROCEDURE: MIPS QUALITY
Quality 402: Tobacco Use And Help With Quitting Among Adolescents: Patient screened for tobacco and is a smoker AND received Cessation Counseling
Quality 226: Preventive Care And Screening: Tobacco Use: Screening And Cessation Intervention: Patient screened for tobacco use, is a smoker AND received Cessation Counseling within measurement period or in the six months prior to the measurement period
Detail Level: Detailed

## 2023-06-04 DIAGNOSIS — F43.22 ADJUSTMENT DISORDER WITH ANXIOUS MOOD: ICD-10-CM

## 2023-06-04 DIAGNOSIS — G37.9 CNS DEMYELINATION (HCC): ICD-10-CM

## 2023-06-04 DIAGNOSIS — R29.898 RIGHT ARM WEAKNESS: ICD-10-CM

## 2023-06-05 ENCOUNTER — TELEPHONE (OUTPATIENT)
Dept: NEUROLOGY | Facility: CLINIC | Age: 53
End: 2023-06-05

## 2023-06-05 DIAGNOSIS — G35 MS (MULTIPLE SCLEROSIS) (HCC): Primary | ICD-10-CM

## 2023-06-05 RX ORDER — FLUOXETINE 10 MG/1
CAPSULE ORAL
Qty: 30 CAPSULE | Refills: 1 | Status: SHIPPED | OUTPATIENT
Start: 2023-06-05

## 2023-06-05 NOTE — TELEPHONE ENCOUNTER
Patient called into say that she has a standing monthly order for labs as she is taking Aubagio.  She went for blood work on Saturday and was told there is no longer a standing script.  # [phone]    I called patient back and left a message that her VM was recv'd and will be sending to Clinical Nursing Team.

## 2023-06-07 ENCOUNTER — APPOINTMENT (RX ONLY)
Dept: URBAN - NONMETROPOLITAN AREA CLINIC 4 | Facility: CLINIC | Age: 53
Setting detail: DERMATOLOGY
End: 2023-06-07

## 2023-06-07 DIAGNOSIS — D22 MELANOCYTIC NEVI: ICD-10-CM

## 2023-06-07 PROBLEM — D48.5 NEOPLASM OF UNCERTAIN BEHAVIOR OF SKIN: Status: ACTIVE | Noted: 2023-06-07

## 2023-06-07 PROCEDURE — ? SHAVE REMOVAL

## 2023-06-07 PROCEDURE — ? PHOTO-DOCUMENTATION

## 2023-06-07 PROCEDURE — ? COUNSELING

## 2023-06-07 PROCEDURE — ? TREATMENT REGIMEN

## 2023-06-07 PROCEDURE — 11300 SHAVE SKIN LESION 0.5 CM/<: CPT

## 2023-06-07 ASSESSMENT — LOCATION SIMPLE DESCRIPTION DERM: LOCATION SIMPLE: RIGHT SHOULDER

## 2023-06-07 ASSESSMENT — LOCATION DETAILED DESCRIPTION DERM: LOCATION DETAILED: RIGHT ANTERIOR SHOULDER

## 2023-06-07 ASSESSMENT — LOCATION ZONE DERM: LOCATION ZONE: ARM

## 2023-06-07 NOTE — TELEPHONE ENCOUNTER
Priya Mendes MD  to Me        6/7/23 10:49 AM   As per FDA - total 6 hepatic function test advised at the time of starting Aubagio to avoid FULMINANT LIVER FAILURE.

## 2023-06-07 NOTE — TELEPHONE ENCOUNTER
Please place additional lab orders for hepatic function test. Pt went to lab this morning but was advised there are no standing orders.

## 2023-06-07 NOTE — TELEPHONE ENCOUNTER
Left detailed message (ok per consent on file) advising pt of Dr DOMINGUEZ's recommendation for 6 hepatic function tests. Informed pt of lab orders placed. Apologized to her that order was not available when she went to get it done. Advised her to call back in any questions.

## 2023-06-07 NOTE — PROCEDURE: SHAVE REMOVAL
Medical Necessity Information: It is in your best interest to select a reason for this procedure from the list below. All of these items fulfill various CMS LCD requirements except the new and changing color options.
Medical Necessity Clause: This procedure was medically necessary because the lesion that was treated was:
Lab: 6
Lab Facility: 3
Detail Level: Detailed
Was A Bandage Applied: Yes
Size Of Lesion In Cm (Required): 0.3
X Size Of Lesion In Cm (Optional): 0
Depth Of Shave: dermis
Biopsy Method: Dermablade
Anesthesia Type: 1% lidocaine with epinephrine
Anesthesia Volume In Cc: 0.5
Hemostasis: Electrocautery and Aluminum Chloride
Wound Care: Petrolatum
Path Notes (To The Dermatopathologist): Please check margins
Render Path Notes In Note?: No
Consent was obtained from the patient. The risks and benefits to therapy were discussed in detail. Specifically, the risks of infection, scarring, bleeding, prolonged wound healing, incomplete removal, allergy to anesthesia, nerve injury and recurrence were addressed. Prior to the procedure, the treatment site was clearly identified and confirmed by the patient. All components of Universal Protocol/PAUSE Rule completed.
Post-Care Instructions: I reviewed with the patient in detail post-care instructions. Patient is to keep the biopsy site dry overnight, and then apply bacitracin twice daily until healed. Patient may apply hydrogen peroxide soaks to remove any crusting.
Notification Instructions: Patient will be notified of pathology results. However, patient instructed to call the office if not contacted within 2 weeks.
Billing Type: Third-Party Bill

## 2023-06-07 NOTE — TELEPHONE ENCOUNTER
Dr. DOMINGUEZ - Is pt to continue with Hepatic panel monthly for Auabgio? Hepatic panel normal x 4. Pt has been on taking 14 mg since 2/2023. Please advise.

## 2023-07-01 ENCOUNTER — APPOINTMENT (OUTPATIENT)
Dept: LAB | Facility: CLINIC | Age: 53
End: 2023-07-01
Payer: COMMERCIAL

## 2023-07-01 LAB
ALBUMIN SERPL BCP-MCNC: 3.9 G/DL (ref 3.5–5)
ALP SERPL-CCNC: 57 U/L (ref 46–116)
ALT SERPL W P-5'-P-CCNC: 49 U/L (ref 12–78)
AST SERPL W P-5'-P-CCNC: 17 U/L (ref 5–45)
BILIRUB DIRECT SERPL-MCNC: 0.1 MG/DL (ref 0–0.2)
BILIRUB SERPL-MCNC: 0.46 MG/DL (ref 0.2–1)
PROT SERPL-MCNC: 7.5 G/DL (ref 6.4–8.4)

## 2023-07-01 PROCEDURE — 36415 COLL VENOUS BLD VENIPUNCTURE: CPT

## 2023-07-01 PROCEDURE — 80076 HEPATIC FUNCTION PANEL: CPT

## 2023-07-24 ENCOUNTER — OFFICE VISIT (OUTPATIENT)
Dept: RHEUMATOLOGY | Facility: CLINIC | Age: 53
End: 2023-07-24
Payer: COMMERCIAL

## 2023-07-24 ENCOUNTER — APPOINTMENT (OUTPATIENT)
Dept: RADIOLOGY | Facility: MEDICAL CENTER | Age: 53
End: 2023-07-24
Payer: COMMERCIAL

## 2023-07-24 ENCOUNTER — TELEPHONE (OUTPATIENT)
Dept: NEUROLOGY | Facility: CLINIC | Age: 53
End: 2023-07-24

## 2023-07-24 ENCOUNTER — TELEPHONE (OUTPATIENT)
Dept: OBGYN CLINIC | Facility: HOSPITAL | Age: 53
End: 2023-07-24

## 2023-07-24 ENCOUNTER — APPOINTMENT (OUTPATIENT)
Dept: LAB | Facility: CLINIC | Age: 53
End: 2023-07-24
Payer: COMMERCIAL

## 2023-07-24 VITALS
BODY MASS INDEX: 25.16 KG/M2 | WEIGHT: 151 LBS | SYSTOLIC BLOOD PRESSURE: 124 MMHG | HEIGHT: 65 IN | DIASTOLIC BLOOD PRESSURE: 74 MMHG

## 2023-07-24 DIAGNOSIS — M19.90 OSTEOARTHRITIS, UNSPECIFIED OSTEOARTHRITIS TYPE, UNSPECIFIED SITE: ICD-10-CM

## 2023-07-24 DIAGNOSIS — R76.8 RHEUMATOID FACTOR POSITIVE: ICD-10-CM

## 2023-07-24 DIAGNOSIS — R76.8 RHEUMATOID FACTOR POSITIVE: Primary | ICD-10-CM

## 2023-07-24 DIAGNOSIS — M25.50 HYPERMOBILITY ARTHRALGIA: ICD-10-CM

## 2023-07-24 DIAGNOSIS — G35 MS (MULTIPLE SCLEROSIS) (HCC): Primary | ICD-10-CM

## 2023-07-24 DIAGNOSIS — R29.898 RIGHT ARM WEAKNESS: ICD-10-CM

## 2023-07-24 DIAGNOSIS — R20.0 NUMBNESS OF RIGHT FOOT: ICD-10-CM

## 2023-07-24 DIAGNOSIS — F43.22 ADJUSTMENT DISORDER WITH ANXIOUS MOOD: ICD-10-CM

## 2023-07-24 LAB
T4 FREE SERPL-MCNC: 0.74 NG/DL (ref 0.61–1.12)
TSH SERPL DL<=0.05 MIU/L-ACNC: 0.34 UIU/ML (ref 0.45–4.5)

## 2023-07-24 PROCEDURE — 86200 CCP ANTIBODY: CPT | Performed by: INTERNAL MEDICINE

## 2023-07-24 PROCEDURE — 86038 ANTINUCLEAR ANTIBODIES: CPT | Performed by: INTERNAL MEDICINE

## 2023-07-24 PROCEDURE — 36415 COLL VENOUS BLD VENIPUNCTURE: CPT | Performed by: INTERNAL MEDICINE

## 2023-07-24 PROCEDURE — 73130 X-RAY EXAM OF HAND: CPT

## 2023-07-24 PROCEDURE — 72200 X-RAY EXAM SI JOINTS: CPT

## 2023-07-24 PROCEDURE — 99204 OFFICE O/P NEW MOD 45 MIN: CPT | Performed by: INTERNAL MEDICINE

## 2023-07-24 PROCEDURE — 84443 ASSAY THYROID STIM HORMONE: CPT | Performed by: INTERNAL MEDICINE

## 2023-07-24 PROCEDURE — 84439 ASSAY OF FREE THYROXINE: CPT | Performed by: INTERNAL MEDICINE

## 2023-07-24 RX ORDER — IBUPROFEN 800 MG/1
800 TABLET ORAL EVERY 8 HOURS PRN
Qty: 90 TABLET | Refills: 6 | Status: SHIPPED | OUTPATIENT
Start: 2023-07-24

## 2023-07-24 NOTE — PATIENT INSTRUCTIONS
Continue ibuprofen up to 800mg 3 times a day as needed for joint pain, take with food  Do labs  Do hand and low back x-rays    Return to clinic in 6 months

## 2023-07-24 NOTE — TELEPHONE ENCOUNTER
EDSON  at 12:08 pm, akbar'd at 16:20:    Hi this is [employer]. I'm Dr. Mona Adorno patient. My  is 1970. I'm calling for 2 reasons. 1 is I messaged the doctor at least a week and a half ago and maybe even 2 weeks and haven't heard back. The other is that I just became aware that my co pay assistance for my Carmen Souza is depleted and I was wondering if there was any direction because I can not afford the medication every month. Okay thank you. # [phone]    I copied and pasted patient's My Chart message she sent into this encounter:    Aster Javi    23  6:58 AM  Hi,  I am slightly concerned that I may be having a flare up. Recently, I have noticed feeling the internal tremors. Also, my scalp has become terribly itchy. It started last Monday, . There is no signs of irritation and I was checked for lice. One day it was so bad I thought I would go crazy. It has been coming and going this past week and the past 2 nights I have awoken in the middle of the night with a strange localized piercing headache. It is just strange, not a normal feeling.  I was wondering if this could be a flare and if I should have a mri to see if the abaugio is working.     Thank you for your time,  Karsten Cheng

## 2023-07-24 NOTE — TELEPHONE ENCOUNTER
Caller: patient   Doctor: Kaila Parry    Reason for call: patient would like to try the 800 mg of Ibuprofen    Preferred pharmacy is [facility] in [place]     Call back#: [phone]

## 2023-07-24 NOTE — PROGRESS NOTES
Assessment and Plan:   77-year-old female medical history MS presenting for evaluation of overall fatigue, hand pain, hip pain. Prior evaluation by rheumatology in 2012, positive KLEBER 1:40 at the time. Rheumatoid factor positive per lab work 10/7/2022 - slightly positive at 10. Patient admtis to dry eyes and has right third trigger finger. Also admits to being extra flexible; could have hypermobility arthralgia. Mother has connective tissue disease related ILD and was on steroids. Continue ibuprofen up to 800mg 3 times a day as needed for joint pain, take with food  Do labs; returned unremarkable  Do hand and low back x-rays; returned unremarkable    Return to clinic in 6 months    Plan:  Diagnoses and all orders for this visit:    Rheumatoid factor positive  -     Ambulatory Referral to Rheumatology  -     Antinuclear Antibodies (KLEBER), IFA  -     Cyclic citrul peptide antibody, IgG  -     TSH, 3rd generation with Free T4 reflex  -     XR hand 3+ vw left; Future  -     XR hand 3+ vw right; Future  -     XR sacroiliac joints < 3 views; Future  -     T4, free    Right arm weakness  -     Ambulatory Referral to Rheumatology    Numbness of right foot  -     Ambulatory Referral to Rheumatology    Hypermobility arthralgia    Follow-up plan: RTC in 6 months        HPI  Ady Plata is a 48 y.o.  female who presents as a Rheumatology consult referred by Kristina Wang, * for evaluation of positive rheumatoid factor. Per chart review, patient was seen by rheumatology in [facility] 9/14/2012. There was concern at that time for KLEBER 1-40, patient reports value was 1-320. Patient was given work a diagnosis of fibromyalgia as well as effects of Hashimoto's thyroiditis, but was not given official diagnosis of fibromyalgia. Patient manage symptoms without medical treatment. Patient was diagnosed with MS in fall 2022 by neurology. Reports all over aches and swelling all over, but denies joint swelling. Reports b/l hand stiffness, especially after working with hands, as well as b/l hip pain with difficulty ambulating. Denies rashes, denies Raynaud's symptoms. Denies family history of autoimmune condition. Review of Systems  Review of Systems   Constitutional: Negative for fatigue. HENT: Negative for mouth sores. Eyes: Negative for pain. Respiratory: Negative for shortness of breath. Cardiovascular: Negative for leg swelling. Musculoskeletal: Positive for arthralgias, back pain and myalgias. Skin: Negative for rash. Neurological: Negative for weakness. Hematological: Negative for adenopathy. Psychiatric/Behavioral: Negative for sleep disturbance. Reviewed and agree.     Allergies  Allergies   Allergen Reactions   • Sulfa Antibiotics Other (See Comments)     Family history ngo jermaine syndrone   • Amoxicillin-Pot Clavulanate Diarrhea   • Molds & Smuts Fatigue       Home Medications    Current Outpatient Medications:   •  cholecalciferol (VITAMIN D3) 1,000 units tablet, Take 1,000 Units by mouth daily Take 1tabs (1000mg) PO daily, Disp: , Rfl:   •  Cyanocobalamin (Vitamin B 12) 500 MCG TABS, Take 1,000 mcg by mouth Take 2 tabs (1000mg) PO daily, Disp: , Rfl:   •  fluticasone (FLONASE) 50 mcg/act nasal spray, 2 sprays into each nostril daily (Patient taking differently: 2 sprays into each nostril daily PRN), Disp: 11.1 mL, Rfl: 0  •  famotidine (PEPCID) 40 MG tablet, take 1 tablet by mouth once daily, Disp: 20 tablet, Rfl: 2  •  FLUoxetine (PROzac) 10 mg capsule, take 1 capsule by mouth once daily (Patient not taking: Reported on 8/2/2023), Disp: 30 capsule, Rfl: 1  •  ibuprofen (MOTRIN) 800 mg tablet, Take 1 tablet (800 mg total) by mouth every 8 (eight) hours as needed for moderate pain, Disp: 90 tablet, Rfl: 6  •  Loratadine 10 MG CAPS, Take by mouth as needed, Disp: , Rfl:   •  Teriflunomide 14 MG TABS, Take 1 tablet (14 mg total) by mouth in the morning, Disp: 90 tablet, Rfl: 3    Past Medical History  History reviewed. No pertinent past medical history. Past Surgical History   History reviewed. No pertinent surgical history. Family History  History reviewed. No pertinent family history. No known family history of autoimmune or inflammatory diseases. Social History    Social History     Substance and Sexual Activity   Alcohol Use Yes    Comment: occasional     Social History     Substance and Sexual Activity   Drug Use Never     Social History     Tobacco Use   Smoking Status Every Day   • Packs/day: 1.00   • Years: 8.00   • Total pack years: 8.00   • Types: Cigarettes   Smokeless Tobacco Never       Objective:  Vitals:    07/24/23 1303   BP: 124/74   Weight: 68.5 kg (151 lb)   Height: 5' 5" (1.651 m)       Physical Exam  Constitutional:       General: She is not in acute distress. Appearance: She is not ill-appearing. Musculoskeletal:         General: No swelling, tenderness or deformity. Normal range of motion. Right lower leg: No edema. Left lower leg: No edema. Psychiatric:         Mood and Affect: Mood normal.         Behavior: Behavior normal.      Comments: Pt appears fatigued, intermittently tearful during exam       Musculoskeletal--Peripheral Joint Exam:  Physical Exam    Joint Exam 07/24/2023     All documented joints were normal       Reviewed labs and imaging. Imaging:   Bilateral hand and SI joint x-rays 7/24/23 - unremarkable    Labs:   Office Visit on 07/24/2023   Component Date Value Ref Range Status   • Antinuclear Antibodies, IFA 07/24/2023 Negative   Final                                         Negative   <1:80                                       Borderline  1:80                                       Positive   >1:80  ICAP nomenclature: AC-0  For more information about Hep-2 cell patterns use  ANApatterns. org, the official website for the International  Consensus on Antinuclear Antibody (KLEBER) Patterns (ICAP).    • Cyclic Citrullinated Peptide Ab  07/24/2023 1.1  See comment Final   • TSH 3RD GENERATON 07/24/2023 0.336 (L)  0.450 - 4.500 uIU/mL Final    The recommended reference ranges for TSH during pregnancy are as follows:   First trimester 0.1 to 2.5 uIU/mL   Second trimester  0.2 to 3.0 uIU/mL   Third trimester 0.3 to 3.0 uIU/m    Note: Normal ranges may not apply to patients who are transgender, non-binary, or whose legal sex, sex at birth, and gender identity differ. Adult TSH (3rd generation) reference range follows the recommended guidelines of the American Thyroid Association, January, 2020. • Free T4 07/24/2023 0.74  0.61 - 1.12 ng/dL Final    Specimens with biotin concentrations > 10 ng/mL can lead to significant (> 10%) positive bias in result. Telephone on 06/05/2023   Component Date Value Ref Range Status   • Total Bilirubin 07/01/2023 0.46  0.20 - 1.00 mg/dL Final    Use of this assay is not recommended for patients undergoing treatment with eltrombopag due to the potential for falsely elevated results. • Bilirubin, Direct 07/01/2023 0.10  0.00 - 0.20 mg/dL Final   • Alkaline Phosphatase 07/01/2023 57  46 - 116 U/L Final   • AST 07/01/2023 17  5 - 45 U/L Final    Specimen collection should occur prior to Sulfasalazine and/or Sulfapyridine administration due to the potential for falsely depressed results. • ALT 07/01/2023 49  12 - 78 U/L Final    Specimen collection should occur prior to Sulfasalazine and/or Sulfapyridine administration due to the potential for falsely depressed results.     • Total Protein 07/01/2023 7.5  6.4 - 8.4 g/dL Final   • Albumin 07/01/2023 3.9  3.5 - 5.0 g/dL Final   Office Visit on 05/06/2023   Component Date Value Ref Range Status   • POCT SARS-CoV-2 Ag 05/06/2023 Negative  Negative Final   • VALID CONTROL 05/06/2023 Valid   Final   Ancillary Orders on 04/07/2023   Component Date Value Ref Range Status   • Total Bilirubin 05/01/2023 0.38  0.20 - 1.00 mg/dL Final    Use of this assay is not recommended for patients undergoing treatment with eltrombopag due to the potential for falsely elevated results. • Bilirubin, Direct 05/01/2023 0.09  0.00 - 0.20 mg/dL Final   • Alkaline Phosphatase 05/01/2023 53  46 - 116 U/L Final   • AST 05/01/2023 14  5 - 45 U/L Final    Specimen collection should occur prior to Sulfasalazine and/or Sulfapyridine administration due to the potential for falsely depressed results. • ALT 05/01/2023 29  12 - 78 U/L Final    Specimen collection should occur prior to Sulfasalazine and/or Sulfapyridine administration due to the potential for falsely depressed results. • Total Protein 05/01/2023 7.4  6.4 - 8.4 g/dL Final   • Albumin 05/01/2023 4.0  3.5 - 5.0 g/dL Final   Ancillary Orders on 03/10/2023   Component Date Value Ref Range Status   • Total Bilirubin 04/03/2023 0.55  0.20 - 1.00 mg/dL Final    Use of this assay is not recommended for patients undergoing treatment with eltrombopag due to the potential for falsely elevated results. • Bilirubin, Direct 04/03/2023 0.09  0.00 - 0.20 mg/dL Final   • Alkaline Phosphatase 04/03/2023 55  46 - 116 U/L Final   • AST 04/03/2023 18  5 - 45 U/L Final    Specimen collection should occur prior to Sulfasalazine and/or Sulfapyridine administration due to the potential for falsely depressed results. • ALT 04/03/2023 41  12 - 78 U/L Final    Specimen collection should occur prior to Sulfasalazine and/or Sulfapyridine administration due to the potential for falsely depressed results. • Total Protein 04/03/2023 6.8  6.4 - 8.4 g/dL Final   • Albumin 04/03/2023 3.6  3.5 - 5.0 g/dL Final   Ancillary Orders on 02/10/2023   Component Date Value Ref Range Status   • Total Bilirubin 03/02/2023 0.29  0.20 - 1.00 mg/dL Final    Use of this assay is not recommended for patients undergoing treatment with eltrombopag due to the potential for falsely elevated results.    • Bilirubin, Direct 03/02/2023 0.08  0.00 - 0.20 mg/dL Final   • Alkaline Phosphatase 03/02/2023 60  46 - 116 U/L Final   • AST 03/02/2023 16  5 - 45 U/L Final    Specimen collection should occur prior to Sulfasalazine and/or Sulfapyridine administration due to the potential for falsely depressed results. • ALT 03/02/2023 27  12 - 78 U/L Final    Specimen collection should occur prior to Sulfasalazine and/or Sulfapyridine administration due to the potential for falsely depressed results.     • Total Protein 03/02/2023 7.8  6.4 - 8.4 g/dL Final   • Albumin 03/02/2023 4.2  3.5 - 5.0 g/dL Final         Deepak Diaz DO PGY-3  Family Medicine

## 2023-07-24 NOTE — LETTER
2023      To Whom it May Concern,     Kye Salinas 1970 tita is diagnosed with multiple sclerosis. Multiple Sclerosis is a chronic demyelinating condition that causes a patient's immune system to aggressively attack the coverage of the neuronal cells, called myelin, causing transient neurologic deficit. There are multiple subtypes of multiple sclerosis. This condition is reversible if the patient has other cells in the brain to repair the damage. Only in genetically predisposed patients can this condition transform to a progressive form with disability. There is no direct correlation between amount of the lesions in the brain and spine and neurologic dysfunction; patients may experience fatigue, intermittent muscle spasms, vision problems, numbness, weakness in arms and legs and/or balance issues. According to the [facility], there is no clear indication of what causes relapses of Multiple Sclerosis. Patients are recommended to follow healthy dietary changes and regular physical activity. For more information about Multiple sclerosis, please visit the National MS Society's website at [address] can also call them at [phone].        Sincerely,         Garrett Hodges MD    [facility], [address] [place] [address] [address] [facility]

## 2023-07-25 LAB
ANA TITR SER IF: NEGATIVE {TITER}
CCP AB SER IA-ACNC: 1.1

## 2023-07-25 RX ORDER — TERIFLUNOMIDE 14 MG/1
14 TABLET, FILM COATED ORAL DAILY
Qty: 90 TABLET | Refills: 3 | Status: SHIPPED | OUTPATIENT
Start: 2023-07-25 | End: 2023-07-31 | Stop reason: SDUPTHER

## 2023-07-25 NOTE — TELEPHONE ENCOUNTER
*LOV - 5/2023 (last in office visit was 10/2022 for initial consult)  Spoke w/pt regarding her call to our office. Re: itchy scalp/r/o lice   Patient did not call PCP. Coworkers checked pt for lice. The itching lasted for appx 2 weeks. Itch was constant; severity was intermittent. Pt was doing  laborious work outside (putting up a swimming pool, in the heat) prior to this occurring. Itching started that night. It has now resolved. Provided education on pseudoexacerbation (physical labor outdoors in heat). Pt verbalized understanding. She is questioning if she if MRI-b would be advisible. Last MRI-b 9/2022. Patient is agreeable to a cooling vest.     Rheumatologist does not feel that patient has rheumatoid arthritis. She has ordered x-rays and additional labs (results pending; available in chart). Aubagio: Patient has depleted copay assistance. Cannot afford the $3,000 copay. She is agreeable to generic teriflunomide. Script pended below for generic teriflunomide. Patient has numerous life stressors (death of mother, single parenthood, aunt now in hospice). Patient became tearful during conversation. I asked patient if she would like to speak with a therapist. Patient is agreeable. Dr. Campbell - please place referral if agreeable. Patient is contemplating some type of disability. She wants to work, feels that working is very important; however, feels she needs a break. She will check with employer to see if they offer any type of STD, etc. She will get back to us. Apologized to patient for delay in getting back to her. Advised her this is d/t the clinical team, not Dr. Aurelia CABRERA - please assist pt w/obtaining a cooling vest. Thanks! Dr. Campbell - please:    1) sign rx for teriflunomide if agreeable. 2)  place referral for therapist.   3) place order for MRI-b if agreeable   Thanks!

## 2023-07-26 NOTE — TELEPHONE ENCOUNTER
MSW phoned pt and discussed Cooling Vest Application. Pt feels she meets the income criteria and will be applying for the program. This writer will send application via [employer]. Pt informed that she will be applying online. -[employer] [facility] (open year round)--  -doctor provide letter with diagnosis   [email or web address]/msaa-help/[email or web address]    Cooling vest info was sent to pt via [employer]. She will complete online application and will let this writer know when submitted in order for letter from provider to be submitted to [id number] [place], [place] [address]d association [facility].

## 2023-07-26 NOTE — TELEPHONE ENCOUNTER
Aubagio prescription was signed, psychology referral is in place; No MRI advised at this point;     If question about short-term disability comes again, formal evaluation by physiatry for functional capacity will be advised

## 2023-07-28 NOTE — TELEPHONE ENCOUNTER
Pt left a VM re: script for Teriflunomide and a letter. Transcribed VM:   Hi, this is [employer]. Date of birth, [birth year]. A patient of Dr. Sherren Bellini. I spoke with Georgina the other day and I'm leaving her a message regarding needing that prescription for the generic of Aubagio and the letter from the doctor. And I was to check on disability insurance at work and yes, I do have it. Okay, thank you. Tosha. Called and spoke with pt and explained that her Script was sent to [address] [place] [address]. She then asked that if she could find a cheaper pharmacy to use, would we be able to send a script to them , to which this writer replied yes. She will look around for the pharmacy that offers the lowest price. She was looking at for a diagnosis letter for both the cooling vest as well as STD. She ask that I forward this message directly to Georgina.

## 2023-07-28 NOTE — TELEPHONE ENCOUNTER
MSW phoned pt to follow up with her regarding cooling vest application. This writer lvm requesting a call back. Dr. Sarah Martinez - Are you agreeable to sign letter to provide to [address] [place] [address] for a cooling vest? Please see below draft, please advise and thank you      To Whom it May Concern,     Hector Sharp 1970   is diagnosed with multiple sclerosis. Multiple Sclerosis is a chronic demyelinating condition that causes a patient's immune system to aggressively attack the coverage of the neuronal cells, called myelin, causing transient neurologic deficit. There are multiple subtypes of multiple sclerosis. This condition is reversible if the patient has other cells in the brain to repair the damage. Only in genetically predisposed patients can this condition transform to a progressive form with disability. There is no direct correlation between amount of the lesions in the brain and spine and neurologic dysfunction; patients may experience fatigue, intermittent muscle spasms, vision problems, numbness, weakness in arms and legs and/or balance issues. According to the [facility], there is no clear indication of what causes relapses of Multiple Sclerosis. Patients are recommended to follow healthy dietary changes and regular physical activity. For more information about Multiple sclerosis, please visit the National MS Society's website at [address] can also call them at [phone].      Sincerely,       Cesar Burgess MD

## 2023-07-31 RX ORDER — TERIFLUNOMIDE 14 MG/1
14 TABLET, FILM COATED ORAL DAILY
Qty: 90 TABLET | Refills: 3 | Status: SHIPPED | OUTPATIENT
Start: 2023-07-31

## 2023-07-31 NOTE — TELEPHONE ENCOUNTER
Spoke w/pt. Advised her of note below. Pt verbalized understanding. Pt has set up account w/[facility]. Advised her script has been sent to provider for signature. Please sign pended script below.

## 2023-07-31 NOTE — TELEPHONE ENCOUNTER
MSW phoned pt at [phone] and pt agreeable to receive letter via [employer]. She will submit with cooling vest application and to [employer].     Pt also informed that she completed her profile with the pharmacy    Letter is now available via [employer]

## 2023-07-31 NOTE — TELEPHONE ENCOUNTER
Spoke w/pt. She advised she did call [employer] and was told a letter of medical necessity would be required in order to proceed. Pt would like assistance in finding therapist. A referral for psychology has been placed.     RICK james

## 2023-07-31 NOTE — TELEPHONE ENCOUNTER
Hi, this is [facility]. I spoke with you just a little bit ago. I did receive the letter in my chart, however the jasson will not let me print it from there. I was wondering if it's possible to send the letter to my e-mail either as a PDF or a document. My e-mail is [email or web address]. My phone number is [phone]. Date of Birth August 3rd, 1970. Thank you.

## 2023-07-31 NOTE — TELEPHONE ENCOUNTER
Hi, this is [employer], date of birth, [birth year]. I spoke with someone today about a prescription for the generic for Aubagio. They said it was sent to [facility]. [facility] claim they received it, but they cannot process it until they speak with the doctor. And that it can take up to 7 business days until its processed. And by that point I will be out of my medicine. So I thought I would give a call to see if that could be verified. Thank you.

## 2023-07-31 NOTE — TELEPHONE ENCOUNTER
[address]. Spoke w/Divina. She states rx is still in progress w/Pharmacy. This takes appx 5-8 business days. Advised her pt is almost out of medication. She will submit request to pharmacy to expedite prescription. Will get an update w/in 24 hours. She cannot tell me at this point if a PA will be required. [address] [facility] [phone]. Spoke w/Kera. She confirmed they do have teriflunomide in stock and confirmed price below for a 90 day supply. teriflunomide 14mg - 90 day supply = $26.40    Spoke w/pt. She would like to use [facility]. Advised her to go to [employer] to set up an account. Advised her we will send rx fro teriflunomide. Pt verbalized understanding. Patient c/o of congestion/cough/sore throat. She has remained afebrile. She was feeling SOB yesterday, but that symptom has resolved. Son has been sick for 9 days; tested for strep, which was negative. He was febrile. Son prescribed steroids. Now taking amoxicillin. Patient asking if it is ok to take her DMT while she is sick. Advised patient to also reach out to her PCP. Script for [facility] pended below. Please advise on bolded question above and sign rx if agreeable. Thanks!

## 2023-07-31 NOTE — TELEPHONE ENCOUNTER
Doris Mobley MD  You 10 minutes ago (10:20 AM)       No need to discontinue Aubagio during her sickness- as long as patient afebrile, she is to continue DMT

## 2023-08-01 NOTE — TELEPHONE ENCOUNTER
Received VM transcription:    Hi, this is [employer]. Dr. Luis Alberto Ruiz patient. Date of birth, August 3, 1970. I had spoke with nurse Raman Gutierrez yesterday and I had a follow up question. If she could give me a call back, I would appreciate it. [phone]. Thank you.  -----------------------------------------------------    Called pt, no answer. LVM acknowledging message received. Advised pt to call back and leave detailed message of her follow up question so we can assist. Informed pt that [employer] message would work as well. Awaiting call back.

## 2023-08-02 ENCOUNTER — APPOINTMENT (OUTPATIENT)
Dept: LAB | Facility: CLINIC | Age: 53
End: 2023-08-02
Payer: COMMERCIAL

## 2023-08-02 ENCOUNTER — TELEPHONE (OUTPATIENT)
Dept: NEUROLOGY | Facility: CLINIC | Age: 53
End: 2023-08-02

## 2023-08-02 ENCOUNTER — OFFICE VISIT (OUTPATIENT)
Dept: URGENT CARE | Facility: CLINIC | Age: 53
End: 2023-08-02
Payer: COMMERCIAL

## 2023-08-02 VITALS
HEART RATE: 115 BPM | SYSTOLIC BLOOD PRESSURE: 114 MMHG | OXYGEN SATURATION: 98 % | WEIGHT: 153 LBS | TEMPERATURE: 98.6 F | BODY MASS INDEX: 25.49 KG/M2 | RESPIRATION RATE: 18 BRPM | DIASTOLIC BLOOD PRESSURE: 85 MMHG | HEIGHT: 65 IN

## 2023-08-02 DIAGNOSIS — D84.9 IMMUNOCOMPROMISED STATE (HCC): ICD-10-CM

## 2023-08-02 DIAGNOSIS — E04.1 THYROID NODULE: ICD-10-CM

## 2023-08-02 DIAGNOSIS — J02.9 ACUTE PHARYNGITIS, UNSPECIFIED ETIOLOGY: Primary | ICD-10-CM

## 2023-08-02 DIAGNOSIS — H65.03 NON-RECURRENT ACUTE SEROUS OTITIS MEDIA OF BOTH EARS: ICD-10-CM

## 2023-08-02 DIAGNOSIS — G35 MS (MULTIPLE SCLEROSIS) (HCC): ICD-10-CM

## 2023-08-02 DIAGNOSIS — R79.89 LOW TSH LEVEL: ICD-10-CM

## 2023-08-02 LAB — S PYO AG THROAT QL: NEGATIVE

## 2023-08-02 PROCEDURE — 99213 OFFICE O/P EST LOW 20 MIN: CPT | Performed by: STUDENT IN AN ORGANIZED HEALTH CARE EDUCATION/TRAINING PROGRAM

## 2023-08-02 PROCEDURE — 84445 ASSAY OF TSI GLOBULIN: CPT

## 2023-08-02 PROCEDURE — 87880 STREP A ASSAY W/OPTIC: CPT | Performed by: STUDENT IN AN ORGANIZED HEALTH CARE EDUCATION/TRAINING PROGRAM

## 2023-08-02 PROCEDURE — 36415 COLL VENOUS BLD VENIPUNCTURE: CPT

## 2023-08-02 RX ORDER — AZITHROMYCIN 250 MG/1
TABLET, FILM COATED ORAL
Qty: 6 TABLET | Refills: 0 | Status: SHIPPED | OUTPATIENT
Start: 2023-08-02 | End: 2023-08-06

## 2023-08-02 NOTE — TELEPHONE ENCOUNTER
[facility] called in. Pt stated she needed to get blood work for Dr. Arcelia Lopez. I did not see any ordered. Please call pt to clarify if anything is needed.    Thank you

## 2023-08-02 NOTE — PROGRESS NOTES
[facility]        NAME: Lorraine Marie is a 46 y.o. female  : 1970    MRN: [id number]  DATE: 2023  TIME: 9:42 AM    Assessment and Plan   Acute pharyngitis, unspecified etiology [J02.9]  1. Acute pharyngitis, unspecified etiology  POCT rapid strepA    azithromycin (ZITHROMAX) 250 mg tablet      2. MS (multiple sclerosis) ([address])        3. Immunocompromised state ([address])  azithromycin (ZITHROMAX) 250 mg tablet      4. Non-recurrent acute serous otitis media of both ears          Pt p/w 5 days of multiple URI symptoms, likely viral pharyngitis, afebrile and otherwise vitals normal. Pt is in immunocompromised state due to being on Aubagio for MS and reports historically, most URIs progress enough to require antibiotics. Given this history, I agree with treating empirically with ZPak to prevent further progression to bacterial infection as pt is at higher risk of a superimposed infection. Warning signs of productive cough, shortness of breath, fever, chills. Recommended using flonase BID for serous OM BL for 1-2 weeks- pt has flonase at home so no rx needed. Patient Instructions       Follow up with PCP in 3-5 days. Proceed to  ER if symptoms worsen. Chief Complaint     Chief Complaint   Patient presents with   • Sore Throat     Sore throat that started on Saturday , difficulty swallowing . • Cough     Cough started yesterday and hurts when breathing          History of Present Illness       HPI     Pt presenting today due to onset of sore throat and cough 5 days ago, with subjective fever though none recorded on thermometer. Also reports left ear pain, but no discharge. Denies rhinorrhea, nausea, vomiting, abd pain, headache, dyspnea. Does report sinus pain and pressure, postnasal drip. Had some nausea on day of onset but none since then. Pt reports URIs often progress to bacterial infections with her due to MS.        Review of Systems   Review of Systems   Constitutional: Negative for chills and fever. HENT: Positive for ear pain, postnasal drip, sinus pressure, sinus pain and sore throat. Negative for ear discharge and rhinorrhea. Eyes: Negative for pain and visual disturbance. Respiratory: Positive for cough. Negative for chest tightness and shortness of breath. Cardiovascular: Negative for chest pain and palpitations. Gastrointestinal: Negative for abdominal pain, constipation, diarrhea, nausea and vomiting. Genitourinary: Negative for dysuria, hematuria and menstrual problem. Musculoskeletal: Negative for arthralgias and back pain. Skin: Negative for color change and rash. Neurological: Negative for seizures and syncope. Psychiatric/Behavioral: Negative for dysphoric mood and suicidal ideas. All other systems reviewed and are negative.         Current Medications       Current Outpatient Medications:   •  Aubagio 14 MG TABS, Take 1 tablet (14 mg total) by mouth daily, Disp: 30 tablet, Rfl: 11  •  azithromycin (ZITHROMAX) 250 mg tablet, Take 2 tablets today then 1 tablet daily x 4 days, Disp: 6 tablet, Rfl: 0  •  cholecalciferol (VITAMIN D3) 1,000 units tablet, Take 1,000 Units by mouth daily Take 1tabs (1000mg) PO daily, Disp: , Rfl:   •  Cyanocobalamin (Vitamin B 12) 500 MCG TABS, Take 1,000 mcg by mouth Take 2 tabs (1000mg) PO daily, Disp: , Rfl:   •  famotidine (PEPCID) 40 MG tablet, take 1 tablet by mouth once daily, Disp: 20 tablet, Rfl: 2  •  fluticasone (FLONASE) 50 mcg/act nasal spray, 2 sprays into each nostril daily (Patient taking differently: 2 sprays into each nostril daily PRN), Disp: 11.1 mL, Rfl: 0  •  ibuprofen (MOTRIN) 800 mg tablet, Take 1 tablet (800 mg total) by mouth every 8 (eight) hours as needed for moderate pain, Disp: 90 tablet, Rfl: 6  •  Loratadine 10 MG CAPS, Take by mouth in the morning, Disp: , Rfl:   •  Teriflunomide 14 MG TABS, Take 1 tablet (14 mg total) by mouth in the morning, Disp: 90 tablet, Rfl: 3  •  FLUoxetine (PROzac) 10 mg capsule, take 1 capsule by mouth once daily (Patient not taking: Reported on 8/2/2023), Disp: 30 capsule, Rfl: 1    Current Allergies     Allergies as of 08/02/2023 - Reviewed 08/02/2023   Allergen Reaction Noted   • Sulfa antibiotics Other (See Comments) 10/10/2022   • Amoxicillin-pot clavulanate Diarrhea 01/13/2022   • Molds & smuts Fatigue 11/15/2021            The following portions of the patient's history were reviewed and updated as appropriate: allergies, current medications, past family history, past medical history, past social history, past surgical history and problem list.     History reviewed. No pertinent past medical history. History reviewed. No pertinent surgical history. History reviewed. No pertinent family history. Medications have been verified. Objective   /85   Pulse (!) 115   Temp 98.6 °F (37 °C)   Resp 18   Ht 5' 5" (1.651 m)   Wt 69.4 kg (153 lb)   SpO2 98%   BMI 25.46 kg/m²        Physical Exam     Physical Exam  Constitutional:       Appearance: Normal appearance. HENT:      Head: Normocephalic and atraumatic. Right Ear: Ear canal normal. No drainage, swelling or tenderness. A middle ear effusion is present. Tympanic membrane is not erythematous. Left Ear: Ear canal normal. No drainage, swelling or tenderness. A middle ear effusion is present. Tympanic membrane is not erythematous. Nose: Rhinorrhea present. No congestion. Mouth/Throat:      Mouth: Mucous membranes are moist.      Pharynx: Oropharynx is clear. Posterior oropharyngeal erythema (mild) present. No oropharyngeal exudate. Tonsils: No tonsillar exudate or tonsillar abscesses. Eyes:      Extraocular Movements:      Right eye: Normal extraocular motion. Left eye: Normal extraocular motion. Conjunctiva/sclera: Conjunctivae normal.   Cardiovascular:      Rate and Rhythm: Normal rate and regular rhythm. Pulses: Normal pulses.       Heart sounds: Normal heart sounds. No murmur heard. Pulmonary:      Effort: Pulmonary effort is normal. No respiratory distress. Breath sounds: Normal breath sounds. No wheezing. Musculoskeletal:      Cervical back: Normal range of motion and neck supple. Right lower leg: No edema. Left lower leg: No edema. Lymphadenopathy:      Cervical: No cervical adenopathy. Skin:     General: Skin is warm and dry. Capillary Refill: Capillary refill takes less than 2 seconds. Neurological:      General: No focal deficit present. Mental Status: She is alert and oriented to person, place, and time.    Psychiatric:         Mood and Affect: Mood normal.         Behavior: Behavior normal.

## 2023-08-02 NOTE — PATIENT INSTRUCTIONS
Use flonase twice daily in both nares for 1-2 weeks     Pharyngitis   WHAT YOU NEED TO KNOW:   Pharyngitis, or sore throat, is inflammation of the tissues and structures in your pharynx (throat). Pharyngitis is most often caused by bacteria or a virus. Other causes include smoking, allergies, or acid reflux. DISCHARGE INSTRUCTIONS:   Call your local emergency number (911 in the [address]) if:   You have trouble breathing or swallowing because your throat is swollen. Return to the emergency department if:   You are drooling because it hurts too much to swallow. Your fever is higher than 102? F (39?C) or lasts longer than 3 days. You are confused. You taste blood. Call your doctor if:   Your throat pain gets worse. You have a painful lump in your throat that does not go away after 5 days. Your symptoms do not improve after 5 days. You have questions or concerns about your condition or care. Medicines:  Viral pharyngitis will go away on its own without treatment. Your sore throat should start to feel better in 3 to 5 days. You may need any of the following:  Antibiotics  treat a bacterial infection. NSAIDs  help decrease swelling and pain or fever. This medicine is available with or without a doctor's order. NSAIDs can cause stomach bleeding or kidney problems in certain people. If you take blood thinner medicine, always ask your healthcare provider if NSAIDs are safe for you. Always read the medicine label and follow directions. Acetaminophen  decreases pain and fever. It is available without a doctor's order. Ask how much to take and how often to take it. Follow directions. Read the labels of all other medicines you are using to see if they also contain acetaminophen, or ask your doctor or pharmacist. Acetaminophen can cause liver damage if not taken correctly. Take your medicine as directed.   Contact your healthcare provider if you think your medicine is not helping or if you have side effects. Tell your provider if you are allergic to any medicine. Keep a list of the medicines, vitamins, and herbs you take. Include the amounts, and when and why you take them. Bring the list or the pill bottles to follow-up visits. Carry your medicine list with you in case of an emergency. Manage your symptoms:   Gargle salt water. Mix ¼ teaspoon salt in an 8 ounce glass of warm water and gargle. Do not swallow. Salt water may help decrease swelling in your throat. Drink liquids as directed. You may need to drink more liquids than usual. Liquids may help soothe your throat and prevent dehydration. Ask how much liquid to drink each day and which liquids are best for you. Use a cool mist humidifier. This will add moisture to the air and help decrease your cough. Soothe your throat. Cough drops, ice, soft foods, or popsicles may help decrease throat pain. Prevent the spread of pharyngitis:  Cover your mouth and nose when you cough or sneeze. Do not share food or drinks. Wash your hands often. Use soap and water. If soap and water are unavailable, use an alcohol-based hand . Follow up with your doctor as directed:  Write down your questions so you remember to ask them during your visits. © Copyright [employer] 2022 Information is for End User's use only and may not be sold, redistributed or otherwise used for commercial purposes. The above information is an  only. It is not intended as medical advice for individual conditions or treatments. Talk to your doctor, nurse or pharmacist before following any medical regimen to see if it is safe and effective for you.

## 2023-08-03 NOTE — TELEPHONE ENCOUNTER
Patient sent message stating she was concerned of flair or new symptoms. ([employer] message dated 7/13/2023 -  not yet responded to). Asking if she should have MRI done sooner than the 1 year as previously noted. Symptoms noted in 7/13/2023 [employer] message have some what subsided. Her questions at this point are scalp itchiness and headaches concern for new lesions? Wondering if Carmen Libby is doing its job - which is why the question about a sooner MRI.

## 2023-08-03 NOTE — TELEPHONE ENCOUNTER
Covering for Dr. Topete [place] message 7/13 is at the bottom of this encounter and was being addressed by Dr. Sandor Jackson.  Dr. Sandor Jackson said no MRI needed, but if patient is concerned, I went ahead and ordered it

## 2023-08-04 LAB — TSI SER-ACNC: <0.1 IU/L (ref 0–0.55)

## 2023-08-07 NOTE — TELEPHONE ENCOUNTER
No additional serologic work up advised; last hepatic panel completed in July is NORMAL.  We will have semiannual CBC/CMP within 6 months

## 2023-08-07 NOTE — TELEPHONE ENCOUNTER
[facility] lab called in. Pt stated she needed to get blood work for Dr. Sabi Nguyen. I did not see any ordered. Please call pt to clarify if anything is needed. Thank you        Pt started Aubagio in 1/2023. Pt had hepatic function panel completed 7/2023. Per 6/5/23 telephone note:  Robert Herrmann MD  to Neurology [facility] Clinical Team 3        6/7/23  1:50 PM    As per FDA - total 6 hepatic function test advised at the time of starting Aubagio to avoid FULMINANT LIVER FAILURE. Dr. Sabi Nguyen - pt has completed 5 hepatic function panels (Feb, Mar, April, May, July). Would you like a 6th lab done at this time?

## 2023-08-07 NOTE — TELEPHONE ENCOUNTER
August 7, 2023  Me  to [address] [place] [address]      8/7/23  5:07 PM  Thank you for contacting [facility],     Hi Radha Rosales,     Your last hepatic function panel in July was normal. Dr. Anali Antony does not require additional labwork at this time. We will order semiannual CBC/CMP labwork within six (6) months.        Please do not hesitate to call our office at [phone] with any questions or concerns.     Have a good day!     Georgina MOON RN        Thank you for choosing [facility] for your healthcare needs!

## 2023-08-09 ENCOUNTER — TELEPHONE (OUTPATIENT)
Dept: NEUROLOGY | Facility: CLINIC | Age: 53
End: 2023-08-09

## 2023-08-09 NOTE — TELEPHONE ENCOUNTER
MSW received incoming call from pt. Informing that she is not sure about what to do. Pt informed feeling Exhaustion, weakness in right leg and trouble with Speech. She had 2 direct deaths in family recently and its making her condition worse. 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 She also shared to have a list of counselors.      Discussion with Gino Rivera - pt confirmed that would like to take 8/18 appt at 1:30pm (virtual)

## 2023-08-09 NOTE — TELEPHONE ENCOUNTER
Pt confirmed via [employer] message that she received cooling vest. MSW remains available for any future social needs.

## 2023-08-14 DIAGNOSIS — R20.0 NUMBNESS OF RIGHT FOOT: Primary | ICD-10-CM

## 2023-08-14 DIAGNOSIS — R76.8 RHEUMATOID FACTOR POSITIVE: ICD-10-CM

## 2023-08-18 ENCOUNTER — TELEMEDICINE (OUTPATIENT)
Dept: NEUROLOGY | Facility: CLINIC | Age: 53
End: 2023-08-18
Payer: COMMERCIAL

## 2023-08-18 ENCOUNTER — APPOINTMENT (OUTPATIENT)
Dept: LAB | Facility: CLINIC | Age: 53
End: 2023-08-18
Payer: COMMERCIAL

## 2023-08-18 ENCOUNTER — TELEPHONE (OUTPATIENT)
Dept: NEUROLOGY | Facility: CLINIC | Age: 53
End: 2023-08-18

## 2023-08-18 DIAGNOSIS — N31.9 NEUROGENIC BLADDER: ICD-10-CM

## 2023-08-18 DIAGNOSIS — R20.0 NUMBNESS OF RIGHT FOOT: ICD-10-CM

## 2023-08-18 DIAGNOSIS — G35 MS (MULTIPLE SCLEROSIS) (HCC): Primary | ICD-10-CM

## 2023-08-18 DIAGNOSIS — R29.898 RIGHT ARM WEAKNESS: ICD-10-CM

## 2023-08-18 DIAGNOSIS — G35 MS (MULTIPLE SCLEROSIS) (HCC): ICD-10-CM

## 2023-08-18 LAB
ALBUMIN SERPL BCP-MCNC: 3.8 G/DL (ref 3.5–5)
ALP SERPL-CCNC: 60 U/L (ref 46–116)
ALT SERPL W P-5'-P-CCNC: 30 U/L (ref 12–78)
ANION GAP SERPL CALCULATED.3IONS-SCNC: 5 MMOL/L
AST SERPL W P-5'-P-CCNC: 14 U/L (ref 5–45)
BASOPHILS # BLD AUTO: 0.15 THOUSANDS/ÂΜL (ref 0–0.1)
BASOPHILS NFR BLD AUTO: 2 % (ref 0–1)
BILIRUB SERPL-MCNC: 0.42 MG/DL (ref 0.2–1)
BUN SERPL-MCNC: 12 MG/DL (ref 5–25)
CALCIUM SERPL-MCNC: 9.6 MG/DL (ref 8.3–10.1)
CHLORIDE SERPL-SCNC: 115 MMOL/L (ref 96–108)
CO2 SERPL-SCNC: 25 MMOL/L (ref 21–32)
CREAT SERPL-MCNC: 0.64 MG/DL (ref 0.6–1.3)
EOSINOPHIL # BLD AUTO: 0.77 THOUSAND/ÂΜL (ref 0–0.61)
EOSINOPHIL NFR BLD AUTO: 10 % (ref 0–6)
ERYTHROCYTE [DISTWIDTH] IN BLOOD BY AUTOMATED COUNT: 13.4 % (ref 11.6–15.1)
GFR SERPL CREATININE-BSD FRML MDRD: 102 ML/MIN/1.73SQ M
GLUCOSE P FAST SERPL-MCNC: 103 MG/DL (ref 65–99)
HCT VFR BLD AUTO: 44.6 % (ref 34.8–46.1)
HGB BLD-MCNC: 14.7 G/DL (ref 11.5–15.4)
IMM GRANULOCYTES # BLD AUTO: 0.02 THOUSAND/UL (ref 0–0.2)
IMM GRANULOCYTES NFR BLD AUTO: 0 % (ref 0–2)
LYMPHOCYTES # BLD AUTO: 2.15 THOUSANDS/ÂΜL (ref 0.6–4.47)
LYMPHOCYTES NFR BLD AUTO: 28 % (ref 14–44)
MCH RBC QN AUTO: 30.2 PG (ref 26.8–34.3)
MCHC RBC AUTO-ENTMCNC: 33 G/DL (ref 31.4–37.4)
MCV RBC AUTO: 92 FL (ref 82–98)
MONOCYTES # BLD AUTO: 0.73 THOUSAND/ÂΜL (ref 0.17–1.22)
MONOCYTES NFR BLD AUTO: 9 % (ref 4–12)
NEUTROPHILS # BLD AUTO: 3.91 THOUSANDS/ÂΜL (ref 1.85–7.62)
NEUTS SEG NFR BLD AUTO: 51 % (ref 43–75)
NRBC BLD AUTO-RTO: 0 /100 WBCS
PLATELET # BLD AUTO: 286 THOUSANDS/UL (ref 149–390)
PMV BLD AUTO: 11.5 FL (ref 8.9–12.7)
POTASSIUM SERPL-SCNC: 3.8 MMOL/L (ref 3.5–5.3)
PROT SERPL-MCNC: 7.4 G/DL (ref 6.4–8.4)
RBC # BLD AUTO: 4.87 MILLION/UL (ref 3.81–5.12)
SODIUM SERPL-SCNC: 145 MMOL/L (ref 135–147)
WBC # BLD AUTO: 7.73 THOUSAND/UL (ref 4.31–10.16)

## 2023-08-18 PROCEDURE — 80053 COMPREHEN METABOLIC PANEL: CPT

## 2023-08-18 PROCEDURE — 36415 COLL VENOUS BLD VENIPUNCTURE: CPT

## 2023-08-18 PROCEDURE — 86800 THYROGLOBULIN ANTIBODY: CPT | Performed by: INTERNAL MEDICINE

## 2023-08-18 PROCEDURE — 85025 COMPLETE CBC W/AUTO DIFF WBC: CPT

## 2023-08-18 PROCEDURE — 99215 OFFICE O/P EST HI 40 MIN: CPT | Performed by: PSYCHIATRY & NEUROLOGY

## 2023-08-18 NOTE — TELEPHONE ENCOUNTER
Please proceed with a General MS Letter describing complexity of MS and related potential disability.     Patient is considering part-time and related insurance changes

## 2023-08-18 NOTE — PROGRESS NOTES
Virtual Regular Visit    Verification of patient location:[place]    Patient is located at Home in the following state in which I hold an active license [place]      Assessment/Plan:    Problem List Items Addressed This Visit        Nervous and Auditory    MS (multiple sclerosis) ([address]) - Primary    Relevant Orders    Ambulatory Referral to Urology    UA w Reflex to Microscopic w Reflex to Culture       Other    Right arm weakness    Numbness of right foot   Other Visit Diagnoses     Neurogenic bladder        Relevant Orders    Ambulatory Referral to Urology               Reason for visit is FU   Chief Complaint   Patient presents with   • Virtual Regular Visit        Encounter provider Umu Hart MD    Provider located at [address] [place] [id number]      Recent Visits  No visits were found meeting these conditions. Showing recent visits within past 7 days and meeting all other requirements  Today's Visits  Date Type Provider Dept   08/18/23 Telemedicine Umu Hart MD [facility]   Showing today's visits and meeting all other requirements  Future Appointments  No visits were found meeting these conditions. Showing future appointments within next 150 days and meeting all other requirements       The patient was identified by name and date of birth. Andres Coles was informed that this is a telemedicine visit and that the visit is being conducted through the [facility]. She agrees to proceed. .  My office door was closed. No one else was in the room. She acknowledged consent and understanding of privacy and security of the video platform. The patient has agreed to participate and understands they can discontinue the visit at any time. Patient is aware this is a billable service. Subjective  Andres Coles is a 48 y.o. female with multiple sclerosis and related issues.       HPI     Mrs. Riley Meza has presented to [facility] multiple sclerosis center for follow-up on multiple sclerosis and related issues. Today's concerns are: urgency issues and approx 5 incidents of bowel incontinences. Gait issues, head tells her legs to move and they don't. Patient has no evidence of demyelination in cervical spine; patient has single dimension plaques at the T12 and thoracic region which was noted in 2022 without enhancement. Patient transitioned from  Aubagio to teriflunomide. Patient believes she has gradual worsening/progression of her underlying neurological condition which limits her to work full-time at [employer]. Patient stated she requires 2 hours nap after she is coming from her work 3 times a week and then she wakes up and she feels sick. Patient also reports weird feeling in her feet with pain in her hands and numbness. Patient stated she has improvement in sensory dysfunction when she is moving. Patient stated ibuprofen provides no benefits. Stress management has been also challenging which brings more exertion and word finding difficulties. Patient also requires evaluation by rheumatology endocrinology for overactive thyroid. Patient has elevated thyroglobulin. Patient has serologic evaluation as well as primary care evaluation for diarrhea. Patient requested letter from  team as she would like to start working on her insurance and transitioning to part-time. Patient responded very well to Solu-Medrol infusion at around that time patient has family stress related to the relapse-unfortunately patient's mother  during that time. Patient believes working long hours at work causing worsening fatigue especially by Thursday. Patient has been feeling achy and swollen requiring more time to recoup.     Patient completed imaging of the thoracic and cervical spine in 2022 with brain imaging completed in 2022, imaging results were previously discussed. Patient will be offered follow-up of brain imaging if she describes any new focal neurological deficit or another events when patient believes she has new symptoms but nonprogressive cold symptoms. Patient is to consider following with primary care physician on scheduled basis. Has no sleep-related dysfunction. Patient is to consider decreasing vitamin D to 1000 international units daily;     Patient is to continue healthy dietary approach and regular physical activity. Faviola Jinabiodun    Mrs. Anatoly Pickens has presented to [facility] multiple sclerosis center for follow-up on multiple sclerosis and related issues. Patient describes having new symptoms such as worsening bladder and bowel incontinence since last seen; patient transition from Aubagio 14 mg to teriflunomide 14 mg 2 weeks ago. Patient had incontinence starting in May 2023. Patient was advised to consider completing brain imaging, referral was provided previously. Patient has single dimension plaques at the T12 on her MRIs completed last year. Patient will be advised to establish care with urology, meanwhile, patient is to consider urinalysis; Patient does have bowel incontinence with colonoscopy completed previously; patient has likely diarrhea at this point. Patient describing worsening exertion with several family members death and worsening mood dysfunction described. Patient was tearful during this office visit as she has difficulties accommodating her day by working full-time, at work 3 times a week. Patient has word finding difficulties with known history of thyroid dysfunction. Patient was recommended to follow-up with endocrinology and rheumatology for multiple other comorbidities with several serological work-up has been pending.     Patient requested general letter describing multiple sclerosis and related issues-our MSW will be helping preparing a letter; patient is to considering proceeding with part-time she may require going to Medicaid; She needs to continue following surgical neurology within 4 to 6 months.        History reviewed. No pertinent past medical history. History reviewed. No pertinent surgical history. Current Outpatient Medications   Medication Sig Dispense Refill   • cholecalciferol (VITAMIN D3) 1,000 units tablet Take 1,000 Units by mouth daily Take 1tabs (1000mg) PO daily     • Cyanocobalamin (Vitamin B 12) 500 MCG TABS Take 1,000 mcg by mouth Take 2 tabs (1000mg) PO daily     • famotidine (PEPCID) 40 MG tablet take 1 tablet by mouth once daily 20 tablet 2   • fluticasone (FLONASE) 50 mcg/act nasal spray 2 sprays into each nostril daily (Patient taking differently: 2 sprays into each nostril daily PRN) 11.1 mL 0   • ibuprofen (MOTRIN) 800 mg tablet Take 1 tablet (800 mg total) by mouth every 8 (eight) hours as needed for moderate pain 90 tablet 6   • Loratadine 10 MG CAPS Take by mouth as needed     • Teriflunomide 14 MG TABS Take 1 tablet (14 mg total) by mouth in the morning 90 tablet 3   • FLUoxetine (PROzac) 10 mg capsule take 1 capsule by mouth once daily (Patient not taking: Reported on 8/2/2023) 30 capsule 1     No current facility-administered medications for this visit. Allergies   Allergen Reactions   • Sulfa Antibiotics Other (See Comments)     Family history huber little   • Amoxicillin-Pot Clavulanate Diarrhea   • Molds & Smuts Fatigue       Review of Systems   Constitutional: Negative for appetite change, fatigue and fever. HENT: Negative. Negative for hearing loss, tinnitus, trouble swallowing and voice change. Eyes: Negative. Negative for photophobia, pain and visual disturbance. Respiratory: Negative. Negative for shortness of breath. Cardiovascular: Negative. Negative for palpitations. Gastrointestinal: Negative. Negative for nausea and vomiting.         Had couple issues of bowel incontinence has happened about 5 times last episode last week   Endocrine: Negative. Negative for cold intolerance. Genitourinary: Positive for urgency. Negative for dysuria and frequency. Musculoskeletal: Positive for gait problem. Negative for back pain, myalgias and neck pain. Head tells her to move but legs do not cooperate   Skin: Negative. Negative for rash. Allergic/Immunologic: Negative. Neurological: Negative for dizziness, tremors, seizures, syncope, facial asymmetry, speech difficulty, weakness, light-headedness, numbness and headaches. Hematological: Negative. Does not bruise/bleed easily. Psychiatric/Behavioral: Negative. Negative for confusion, hallucinations and sleep disturbance. All other systems reviewed and are negative. Head got very itchy, she spoke w/nurses and they scheduled this appt. Has now gone after 2 weeks  Video Exam    There were no vitals filed for this visit.     Physical Exam     Visit Time  Total Visit Duration: 30 min

## 2023-08-19 ENCOUNTER — APPOINTMENT (OUTPATIENT)
Dept: LAB | Facility: CLINIC | Age: 53
End: 2023-08-19
Payer: COMMERCIAL

## 2023-08-19 LAB
BILIRUB UR QL STRIP: NEGATIVE
CLARITY UR: CLEAR
COLOR UR: COLORLESS
GLUCOSE UR STRIP-MCNC: NEGATIVE MG/DL
HGB UR QL STRIP.AUTO: NEGATIVE
KETONES UR STRIP-MCNC: NEGATIVE MG/DL
LEUKOCYTE ESTERASE UR QL STRIP: NEGATIVE
NITRITE UR QL STRIP: NEGATIVE
PH UR STRIP.AUTO: 7 [PH]
PROT UR STRIP-MCNC: NEGATIVE MG/DL
SP GR UR STRIP.AUTO: 1.01 (ref 1–1.03)
THYROGLOB AB SERPL-ACNC: <1 IU/ML (ref 0–0.9)
UROBILINOGEN UR STRIP-ACNC: <2 MG/DL

## 2023-08-19 PROCEDURE — 81003 URINALYSIS AUTO W/O SCOPE: CPT | Performed by: PSYCHIATRY & NEUROLOGY

## 2023-08-21 NOTE — TELEPHONE ENCOUNTER
SW called patient at [phone]. Patient would like a letter however, not a general MS letter. Patient would like to be able to ease her way back into work starting with working less days. Patient is hoping the letter can include symptoms she has: balance, cognitive changes, exhaustion, incontinence of bowel and bladder. Patient has also been experiencing a lot of stress due to personal issues, which is aggravating her MS symptoms. Patient has been off the last two weeks and has noticed improvement. Patient is going into work on Wednesday, 8/23 and will talk with her boss, then get back to this writer about what is needed. SW did review physiatry and FCE's in case this is something patient will require. Patient does not want to stop working. SW awaiting call from patient after meeting with her boss.

## 2023-08-24 ENCOUNTER — APPOINTMENT (RX ONLY)
Dept: URBAN - NONMETROPOLITAN AREA CLINIC 4 | Facility: CLINIC | Age: 53
Setting detail: DERMATOLOGY
End: 2023-08-24

## 2023-08-24 PROBLEM — D23.62 OTHER BENIGN NEOPLASM OF SKIN OF LEFT UPPER LIMB, INCLUDING SHOULDER: Status: ACTIVE | Noted: 2023-08-24

## 2023-08-24 PROCEDURE — ? PHOTO-DOCUMENTATION

## 2023-08-24 PROCEDURE — ? EXCISION

## 2023-08-24 PROCEDURE — 12032 INTMD RPR S/A/T/EXT 2.6-7.5: CPT

## 2023-08-24 PROCEDURE — 11402 EXC TR-EXT B9+MARG 1.1-2 CM: CPT

## 2023-08-24 PROCEDURE — ? TREATMENT REGIMEN

## 2023-08-24 PROCEDURE — ? PRESCRIPTION

## 2023-08-24 PROCEDURE — ? COUNSELING

## 2023-08-24 RX ORDER — DOXYCYCLINE HYCLATE 100 MG/1
CAPSULE, GELATIN COATED ORAL
Qty: 14 | Refills: 0 | Status: ERX | COMMUNITY
Start: 2023-08-24

## 2023-08-24 RX ADMIN — DOXYCYCLINE HYCLATE 100: 100 CAPSULE, GELATIN COATED ORAL at 00:00

## 2023-08-24 NOTE — PROCEDURE: TREATMENT REGIMEN
Plan: I reviewed with the patient in detail post-care instructions. Patient is not to engage in any heavy lifting, exercise, or swimming for the next 12 days. Should the patient develop any fevers, chills, bleeding, severe pain patient will contact the office immediately. Suture removal in 12 days.\\n\\n** The patient is thinking about going to the beach. Advised to keep the wound clean and dry. Cover with a waterproof Tegaderm-type dressing. No swimming.**
Initiate Treatment: Doxycycline 100 mg PO BID x 7 days
Detail Level: Zone
Otc Regimen: The patient will remove pressure dressing in 48 hrs, then apply Vaseline to the wound daily and keep covered.

## 2023-08-24 NOTE — PROCEDURE: EXCISION
Medical Necessity Information: It is in your best interest to select a reason for this procedure from the list below. All of these items fulfill various CMS LCD requirements except lesion extends to a margin.
Include Z78.9 (Other Specified Conditions Influencing Health Status) As An Associated Diagnosis?: No
Lab: 6
Surgeon (Optional): Donald Feldman PA-C
Surgeon Performing Repair (Optional): Donald Feldman PA-C
Size Of Lesion In Cm: 0.8
Size Of Margin In Cm: 0.3
Anesthesia Volume In Cc: 3
Eye Clamp Note Details: An eye clamp was used during the procedure.
Excision Method: Elliptical
Saucerization Depth: dermis and superficial adipose tissue
Repair Type: Intermediate
Intermediate / Complex Repair - Final Wound Length In Cm: 4
Suturegard Retention Suture: 2-0 Nylon
Retention Suture Bite Size: 3 mm
Length To Time In Minutes Device Was In Place: 10
Number Of Hemigard Strips Per Side: 1
Undermining Type: Entire Wound
Debridement Text: The wound edges were debrided prior to proceeding with the closure to facilitate wound healing.
Helical Rim Text: The closure involved the helical rim.
Vermilion Border Text: The closure involved the vermilion border.
Nostril Rim Text: The closure involved the nostril rim.
Retention Suture Text: Retention sutures were placed to support the closure and prevent dehiscence.
Primary Defect Length (In Cm): 3.4
Primary Defect Width (In Cm): 2.1
Secondary Defect Length (In Cm): 0
Epidermal Closure Graft Donor Site (Optional): simple interrupted
Suture Removal: 12 days
Detail Level: Detailed
Excision Depth: adipose tissue
Scalpel Size: dermablade
Anesthesia Type: 1% lidocaine with epinephrine
Hemostasis: Electrocautery
Estimated Blood Loss (Cc): minimal
Render The Repair Note As A Separate Paragraph: Yes
Deep Sutures: 4-0 Vicryl
Epidermal Sutures: 4-0 Ethilon
Epidermal Closure: running
Wound Care: Petrolatum
Dressing: pressure dressing with telfa
Suturegard Intro: Intraoperative tissue expansion was performed, utilizing the SUTUREGARD device, in order to reduce wound tension.
Suturegard Body: The suture ends were repeatedly re-tightened and re-clamped to achieve the desired tissue expansion.
Hemigard Intro: Due to skin fragility and wound tension, it was decided to use HEMIGARD adhesive retention suture devices to permit a linear closure. The skin was cleaned and dried for a 6cm distance away from the wound. Excessive hair, if present, was removed to allow for adhesion.
Hemigard Postcare Instructions: The HEMIGARD strips are to remain completely dry for at least 5-7 days.
Graft Donor Site Bandage (Optional-Leave Blank If You Don't Want In Note): Steri-strips and a pressure bandage were applied to the donor site.
Positioning (Leave Blank If You Do Not Want): The patient was placed in a comfortable position exposing the surgical site.
Complex Repair Preamble Text (Leave Blank If You Do Not Want): Extensive wide undermining was performed.
Intermediate Repair Preamble Text (Leave Blank If You Do Not Want): Undermining was performed with blunt dissection.
Fusiform Excision Additional Text (Leave Blank If You Do Not Want): The margin was drawn around the clinically apparent lesion.  A fusiform shape was then drawn on the skin incorporating the lesion and margins.  Incisions were then made along these lines to the appropriate tissue plane and the lesion was extirpated.
Eliptical Excision Additional Text (Leave Blank If You Do Not Want): The margin was drawn around the clinically apparent lesion.  An elliptical shape was then drawn on the skin incorporating the lesion and margins.  Incisions were then made along these lines to the appropriate tissue plane and the lesion was extirpated.
Saucerization Excision Additional Text (Leave Blank If You Do Not Want): The margin was drawn around the clinically apparent lesion.  Incisions were then made along these lines, in a tangential fashion, to the appropriate tissue plane and the lesion was extirpated.
Slit Excision Additional Text (Leave Blank If You Do Not Want): A linear line was drawn on the skin overlying the lesion. An incision was made slowly until the lesion was visualized.  Once visualized, the lesion was removed with blunt dissection.
Excisional Biopsy Additional Text (Leave Blank If You Do Not Want): The margin was drawn around the clinically apparent lesion. An elliptical shape was then drawn on the skin incorporating the lesion and margins.  Incisions were then made along these lines to the appropriate tissue plane and the lesion was extirpated.
Perilesional Excision Additional Text (Leave Blank If You Do Not Want): The margin was drawn around the clinically apparent lesion. Incisions were then made along these lines to the appropriate tissue plane and the lesion was extirpated.
Repair Performed By Another Provider Text (Leave Blank If You Do Not Want): After the tissue was excised the defect was repaired by another provider.
No Repair - Repaired With Adjacent Surgical Defect Text (Leave Blank If You Do Not Want): After the excision the defect was repaired concurrently with another surgical defect which was in close approximation.
Adjacent Tissue Transfer Text: The defect edges were debeveled with a #15 scalpel blade.  Given the location of the defect and the proximity to free margins an adjacent tissue transfer was deemed most appropriate.  Using a sterile surgical marker, an appropriate flap was drawn incorporating the defect and placing the expected incisions within the relaxed skin tension lines where possible.    The area thus outlined was incised deep to adipose tissue with a #15 scalpel blade.  The skin margins were undermined to an appropriate distance in all directions utilizing iris scissors.
Advancement Flap (Single) Text: The defect edges were debeveled with a #15 scalpel blade.  Given the location of the defect and the proximity to free margins a single advancement flap was deemed most appropriate.  Using a sterile surgical marker, an appropriate advancement flap was drawn incorporating the defect and placing the expected incisions within the relaxed skin tension lines where possible.    The area thus outlined was incised deep to adipose tissue with a #15 scalpel blade.  The skin margins were undermined to an appropriate distance in all directions utilizing iris scissors.
Advancement Flap (Double) Text: The defect edges were debeveled with a #15 scalpel blade.  Given the location of the defect and the proximity to free margins a double advancement flap was deemed most appropriate.  Using a sterile surgical marker, the appropriate advancement flaps were drawn incorporating the defect and placing the expected incisions within the relaxed skin tension lines where possible.    The area thus outlined was incised deep to adipose tissue with a #15 scalpel blade.  The skin margins were undermined to an appropriate distance in all directions utilizing iris scissors.
Burow's Advancement Flap Text: The defect edges were debeveled with a #15 scalpel blade.  Given the location of the defect and the proximity to free margins a Burow's advancement flap was deemed most appropriate.  Using a sterile surgical marker, the appropriate advancement flap was drawn incorporating the defect and placing the expected incisions within the relaxed skin tension lines where possible.    The area thus outlined was incised deep to adipose tissue with a #15 scalpel blade.  The skin margins were undermined to an appropriate distance in all directions utilizing iris scissors.
Chonodrocutaneous Helical Advancement Flap Text: The defect edges were debeveled with a #15 scalpel blade.  Given the location of the defect and the proximity to free margins a chondrocutaneous helical advancement flap was deemed most appropriate.  Using a sterile surgical marker, the appropriate advancement flap was drawn incorporating the defect and placing the expected incisions within the relaxed skin tension lines where possible.    The area thus outlined was incised deep to adipose tissue with a #15 scalpel blade.  The skin margins were undermined to an appropriate distance in all directions utilizing iris scissors.
Crescentic Advancement Flap Text: The defect edges were debeveled with a #15 scalpel blade.  Given the location of the defect and the proximity to free margins a crescentic advancement flap was deemed most appropriate.  Using a sterile surgical marker, the appropriate advancement flap was drawn incorporating the defect and placing the expected incisions within the relaxed skin tension lines where possible.    The area thus outlined was incised deep to adipose tissue with a #15 scalpel blade.  The skin margins were undermined to an appropriate distance in all directions utilizing iris scissors.
A-T Advancement Flap Text: The defect edges were debeveled with a #15 scalpel blade.  Given the location of the defect, shape of the defect and the proximity to free margins an A-T advancement flap was deemed most appropriate.  Using a sterile surgical marker, an appropriate advancement flap was drawn incorporating the defect and placing the expected incisions within the relaxed skin tension lines where possible.    The area thus outlined was incised deep to adipose tissue with a #15 scalpel blade.  The skin margins were undermined to an appropriate distance in all directions utilizing iris scissors.
O-T Advancement Flap Text: The defect edges were debeveled with a #15 scalpel blade.  Given the location of the defect, shape of the defect and the proximity to free margins an O-T advancement flap was deemed most appropriate.  Using a sterile surgical marker, an appropriate advancement flap was drawn incorporating the defect and placing the expected incisions within the relaxed skin tension lines where possible.    The area thus outlined was incised deep to adipose tissue with a #15 scalpel blade.  The skin margins were undermined to an appropriate distance in all directions utilizing iris scissors.
O-L Flap Text: The defect edges were debeveled with a #15 scalpel blade.  Given the location of the defect, shape of the defect and the proximity to free margins an O-L flap was deemed most appropriate.  Using a sterile surgical marker, an appropriate advancement flap was drawn incorporating the defect and placing the expected incisions within the relaxed skin tension lines where possible.    The area thus outlined was incised deep to adipose tissue with a #15 scalpel blade.  The skin margins were undermined to an appropriate distance in all directions utilizing iris scissors.
O-Z Flap Text: The defect edges were debeveled with a #15 scalpel blade.  Given the location of the defect, shape of the defect and the proximity to free margins an O-Z flap was deemed most appropriate.  Using a sterile surgical marker, an appropriate transposition flap was drawn incorporating the defect and placing the expected incisions within the relaxed skin tension lines where possible. The area thus outlined was incised deep to adipose tissue with a #15 scalpel blade.  The skin margins were undermined to an appropriate distance in all directions utilizing iris scissors.
Double O-Z Flap Text: The defect edges were debeveled with a #15 scalpel blade.  Given the location of the defect, shape of the defect and the proximity to free margins a Double O-Z flap was deemed most appropriate.  Using a sterile surgical marker, an appropriate transposition flap was drawn incorporating the defect and placing the expected incisions within the relaxed skin tension lines where possible. The area thus outlined was incised deep to adipose tissue with a #15 scalpel blade.  The skin margins were undermined to an appropriate distance in all directions utilizing iris scissors.
V-Y Flap Text: The defect edges were debeveled with a #15 scalpel blade.  Given the location of the defect, shape of the defect and the proximity to free margins a V-Y flap was deemed most appropriate.  Using a sterile surgical marker, an appropriate advancement flap was drawn incorporating the defect and placing the expected incisions within the relaxed skin tension lines where possible.    The area thus outlined was incised deep to adipose tissue with a #15 scalpel blade.  The skin margins were undermined to an appropriate distance in all directions utilizing iris scissors.
Advancement-Rotation Flap Text: The defect edges were debeveled with a #15 scalpel blade.  Given the location of the defect, shape of the defect and the proximity to free margins an advancement-rotation flap was deemed most appropriate.  Using a sterile surgical marker, an appropriate flap was drawn incorporating the defect and placing the expected incisions within the relaxed skin tension lines where possible. The area thus outlined was incised deep to adipose tissue with a #15 scalpel blade.  The skin margins were undermined to an appropriate distance in all directions utilizing iris scissors.
Mercedes Flap Text: The defect edges were debeveled with a #15 scalpel blade.  Given the location of the defect, shape of the defect and the proximity to free margins a Mercedes flap was deemed most appropriate.  Using a sterile surgical marker, an appropriate advancement flap was drawn incorporating the defect and placing the expected incisions within the relaxed skin tension lines where possible. The area thus outlined was incised deep to adipose tissue with a #15 scalpel blade.  The skin margins were undermined to an appropriate distance in all directions utilizing iris scissors.
Modified Advancement Flap Text: The defect edges were debeveled with a #15 scalpel blade.  Given the location of the defect, shape of the defect and the proximity to free margins a modified advancement flap was deemed most appropriate.  Using a sterile surgical marker, an appropriate advancement flap was drawn incorporating the defect and placing the expected incisions within the relaxed skin tension lines where possible.    The area thus outlined was incised deep to adipose tissue with a #15 scalpel blade.  The skin margins were undermined to an appropriate distance in all directions utilizing iris scissors.
Mucosal Advancement Flap Text: Given the location of the defect, shape of the defect and the proximity to free margins a mucosal advancement flap was deemed most appropriate. Incisions were made with a 15 blade scalpel in the appropriate fashion along the cutaneous vermilion border and the mucosal lip. The remaining actinically damaged mucosal tissue was excised.  The mucosal advancement flap was then elevated to the gingival sulcus with care taken to preserve the neurovascular structures and advanced into the primary defect. Care was taken to ensure that precise realignment of the vermilion border was achieved.
Peng Advancement Flap Text: The defect edges were debeveled with a #15 scalpel blade.  Given the location of the defect, shape of the defect and the proximity to free margins a Peng advancement flap was deemed most appropriate.  Using a sterile surgical marker, an appropriate advancement flap was drawn incorporating the defect and placing the expected incisions within the relaxed skin tension lines where possible. The area thus outlined was incised deep to adipose tissue with a #15 scalpel blade.  The skin margins were undermined to an appropriate distance in all directions utilizing iris scissors.
Hatchet Flap Text: The defect edges were debeveled with a #15 scalpel blade.  Given the location of the defect, shape of the defect and the proximity to free margins a hatchet flap was deemed most appropriate.  Using a sterile surgical marker, an appropriate hatchet flap was drawn incorporating the defect and placing the expected incisions within the relaxed skin tension lines where possible.    The area thus outlined was incised deep to adipose tissue with a #15 scalpel blade.  The skin margins were undermined to an appropriate distance in all directions utilizing iris scissors.
Rotation Flap Text: The defect edges were debeveled with a #15 scalpel blade.  Given the location of the defect, shape of the defect and the proximity to free margins a rotation flap was deemed most appropriate.  Using a sterile surgical marker, an appropriate rotation flap was drawn incorporating the defect and placing the expected incisions within the relaxed skin tension lines where possible.    The area thus outlined was incised deep to adipose tissue with a #15 scalpel blade.  The skin margins were undermined to an appropriate distance in all directions utilizing iris scissors.
Bilateral Rotation Flap Text: The defect edges were debeveled with a #15 scalpel blade. Given the location of the defect, shape of the defect and the proximity to free margins a bilateral rotation flap was deemed most appropriate. Using a sterile surgical marker, an appropriate rotation flap was drawn incorporating the defect and placing the expected incisions within the relaxed skin tension lines where possible. The area thus outlined was incised deep to adipose tissue with a #15 scalpel blade. The skin margins were undermined to an appropriate distance in all directions utilizing iris scissors. Following this, the designed flap was carried over into the primary defect and sutured into place.
Spiral Flap Text: The defect edges were debeveled with a #15 scalpel blade.  Given the location of the defect, shape of the defect and the proximity to free margins a spiral flap was deemed most appropriate.  Using a sterile surgical marker, an appropriate rotation flap was drawn incorporating the defect and placing the expected incisions within the relaxed skin tension lines where possible. The area thus outlined was incised deep to adipose tissue with a #15 scalpel blade.  The skin margins were undermined to an appropriate distance in all directions utilizing iris scissors.
Staged Advancement Flap Text: The defect edges were debeveled with a #15 scalpel blade.  Given the location of the defect, shape of the defect and the proximity to free margins a staged advancement flap was deemed most appropriate.  Using a sterile surgical marker, an appropriate advancement flap was drawn incorporating the defect and placing the expected incisions within the relaxed skin tension lines where possible. The area thus outlined was incised deep to adipose tissue with a #15 scalpel blade.  The skin margins were undermined to an appropriate distance in all directions utilizing iris scissors.
Star Wedge Flap Text: The defect edges were debeveled with a #15 scalpel blade.  Given the location of the defect, shape of the defect and the proximity to free margins a star wedge flap was deemed most appropriate.  Using a sterile surgical marker, an appropriate rotation flap was drawn incorporating the defect and placing the expected incisions within the relaxed skin tension lines where possible. The area thus outlined was incised deep to adipose tissue with a #15 scalpel blade.  The skin margins were undermined to an appropriate distance in all directions utilizing iris scissors.
Transposition Flap Text: The defect edges were debeveled with a #15 scalpel blade.  Given the location of the defect and the proximity to free margins a transposition flap was deemed most appropriate.  Using a sterile surgical marker, an appropriate transposition flap was drawn incorporating the defect.    The area thus outlined was incised deep to adipose tissue with a #15 scalpel blade.  The skin margins were undermined to an appropriate distance in all directions utilizing iris scissors.
Muscle Hinge Flap Text: The defect edges were debeveled with a #15 scalpel blade.  Given the size, depth and location of the defect and the proximity to free margins a muscle hinge flap was deemed most appropriate.  Using a sterile surgical marker, an appropriate hinge flap was drawn incorporating the defect. The area thus outlined was incised with a #15 scalpel blade.  The skin margins were undermined to an appropriate distance in all directions utilizing iris scissors.
Mustarde Flap Text: The defect edges were debeveled with a #15 scalpel blade.  Given the size, depth and location of the defect and the proximity to free margins a Mustarde flap was deemed most appropriate.  Using a sterile surgical marker, an appropriate flap was drawn incorporating the defect. The area thus outlined was incised with a #15 scalpel blade.  The skin margins were undermined to an appropriate distance in all directions utilizing iris scissors.
Nasal Turnover Hinge Flap Text: The defect edges were debeveled with a #15 scalpel blade.  Given the size, depth, location of the defect and the defect being full thickness a nasal turnover hinge flap was deemed most appropriate.  Using a sterile surgical marker, an appropriate hinge flap was drawn incorporating the defect. The area thus outlined was incised with a #15 scalpel blade. The flap was designed to recreate the nasal mucosal lining and the alar rim. The skin margins were undermined to an appropriate distance in all directions utilizing iris scissors.
Nasalis-Muscle-Based Myocutaneous Island Pedicle Flap Text: Using a #15 blade, an incision was made around the donor flap to the level of the nasalis muscle. Wide lateral undermining was then performed in both the subcutaneous plane above the nasalis muscle, and in a submuscular plane just above periosteum. This allowed the formation of a free nasalis muscle axial pedicle (based on the angular artery) which was still attached to the actual cutaneous flap, increasing its mobility and vascular viability. Hemostasis was obtained with pinpoint electrocoagulation. The flap was mobilized into position and the pivotal anchor points positioned and stabilized with buried interrupted sutures. Subcutaneous and dermal tissues were closed in a multilayered fashion with sutures. Tissue redundancies were excised, and the epidermal edges were apposed without significant tension and sutured with sutures.
Orbicularis Oris Muscle Flap Text: The defect edges were debeveled with a #15 scalpel blade.  Given that the defect affected the competency of the oral sphincter an orbicularis oris muscle flap was deemed most appropriate to restore this competency and normal muscle function.  Using a sterile surgical marker, an appropriate flap was drawn incorporating the defect. The area thus outlined was incised with a #15 scalpel blade.
Melolabial Transposition Flap Text: The defect edges were debeveled with a #15 scalpel blade.  Given the location of the defect and the proximity to free margins a melolabial flap was deemed most appropriate.  Using a sterile surgical marker, an appropriate melolabial transposition flap was drawn incorporating the defect.    The area thus outlined was incised deep to adipose tissue with a #15 scalpel blade.  The skin margins were undermined to an appropriate distance in all directions utilizing iris scissors.
Rhombic Flap Text: The defect edges were debeveled with a #15 scalpel blade.  Given the location of the defect and the proximity to free margins a rhombic flap was deemed most appropriate.  Using a sterile surgical marker, an appropriate rhombic flap was drawn incorporating the defect.    The area thus outlined was incised deep to adipose tissue with a #15 scalpel blade.  The skin margins were undermined to an appropriate distance in all directions utilizing iris scissors.
Rhomboid Transposition Flap Text: The defect edges were debeveled with a #15 scalpel blade.  Given the location of the defect and the proximity to free margins a rhomboid transposition flap was deemed most appropriate.  Using a sterile surgical marker, an appropriate rhomboid flap was drawn incorporating the defect.    The area thus outlined was incised deep to adipose tissue with a #15 scalpel blade.  The skin margins were undermined to an appropriate distance in all directions utilizing iris scissors.
Bi-Rhombic Flap Text: The defect edges were debeveled with a #15 scalpel blade.  Given the location of the defect and the proximity to free margins a bi-rhombic flap was deemed most appropriate.  Using a sterile surgical marker, an appropriate rhombic flap was drawn incorporating the defect. The area thus outlined was incised deep to adipose tissue with a #15 scalpel blade.  The skin margins were undermined to an appropriate distance in all directions utilizing iris scissors.
Helical Rim Advancement Flap Text: The defect edges were debeveled with a #15 blade scalpel.  Given the location of the defect and the proximity to free margins (helical rim) a double helical rim advancement flap was deemed most appropriate.  Using a sterile surgical marker, the appropriate advancement flaps were drawn incorporating the defect and placing the expected incisions between the helical rim and antihelix where possible.  The area thus outlined was incised through and through with a #15 scalpel blade.  With a skin hook and iris scissors, the flaps were gently and sharply undermined and freed up.
Bilateral Helical Rim Advancement Flap Text: The defect edges were debeveled with a #15 blade scalpel.  Given the location of the defect and the proximity to free margins (helical rim) a bilateral helical rim advancement flap was deemed most appropriate.  Using a sterile surgical marker, the appropriate advancement flaps were drawn incorporating the defect and placing the expected incisions between the helical rim and antihelix where possible.  The area thus outlined was incised through and through with a #15 scalpel blade.  With a skin hook and iris scissors, the flaps were gently and sharply undermined and freed up.
Ear Star Wedge Flap Text: The defect edges were debeveled with a #15 blade scalpel.  Given the location of the defect and the proximity to free margins (helical rim) an ear star wedge flap was deemed most appropriate.  Using a sterile surgical marker, the appropriate flap was drawn incorporating the defect and placing the expected incisions between the helical rim and antihelix where possible.  The area thus outlined was incised through and through with a #15 scalpel blade.
Banner Transposition Flap Text: The defect edges were debeveled with a #15 scalpel blade.  Given the location of the defect and the proximity to free margins a Banner transposition flap was deemed most appropriate.  Using a sterile surgical marker, an appropriate flap drawn around the defect. The area thus outlined was incised deep to adipose tissue with a #15 scalpel blade.  The skin margins were undermined to an appropriate distance in all directions utilizing iris scissors.
Bilobed Flap Text: The defect edges were debeveled with a #15 scalpel blade.  Given the location of the defect and the proximity to free margins a bilobe flap was deemed most appropriate.  Using a sterile surgical marker, an appropriate bilobe flap drawn around the defect.    The area thus outlined was incised deep to adipose tissue with a #15 scalpel blade.  The skin margins were undermined to an appropriate distance in all directions utilizing iris scissors.
Bilobed Transposition Flap Text: The defect edges were debeveled with a #15 scalpel blade.  Given the location of the defect and the proximity to free margins a bilobed transposition flap was deemed most appropriate.  Using a sterile surgical marker, an appropriate bilobe flap drawn around the defect.    The area thus outlined was incised deep to adipose tissue with a #15 scalpel blade.  The skin margins were undermined to an appropriate distance in all directions utilizing iris scissors.
Trilobed Flap Text: The defect edges were debeveled with a #15 scalpel blade.  Given the location of the defect and the proximity to free margins a trilobed flap was deemed most appropriate.  Using a sterile surgical marker, an appropriate trilobed flap drawn around the defect.    The area thus outlined was incised deep to adipose tissue with a #15 scalpel blade.  The skin margins were undermined to an appropriate distance in all directions utilizing iris scissors.
Dorsal Nasal Flap Text: The defect edges were debeveled with a #15 scalpel blade.  Given the location of the defect and the proximity to free margins a dorsal nasal flap was deemed most appropriate.  Using a sterile surgical marker, an appropriate dorsal nasal flap was drawn around the defect.    The area thus outlined was incised deep to adipose tissue with a #15 scalpel blade.  The skin margins were undermined to an appropriate distance in all directions utilizing iris scissors.
Island Pedicle Flap Text: The defect edges were debeveled with a #15 scalpel blade.  Given the location of the defect, shape of the defect and the proximity to free margins an island pedicle advancement flap was deemed most appropriate.  Using a sterile surgical marker, an appropriate advancement flap was drawn incorporating the defect, outlining the appropriate donor tissue and placing the expected incisions within the relaxed skin tension lines where possible.    The area thus outlined was incised deep to adipose tissue with a #15 scalpel blade.  The skin margins were undermined to an appropriate distance in all directions around the primary defect and laterally outward around the island pedicle utilizing iris scissors.  There was minimal undermining beneath the pedicle flap.
Island Pedicle Flap With Canthal Suspension Text: The defect edges were debeveled with a #15 scalpel blade.  Given the location of the defect, shape of the defect and the proximity to free margins an island pedicle advancement flap was deemed most appropriate.  Using a sterile surgical marker, an appropriate advancement flap was drawn incorporating the defect, outlining the appropriate donor tissue and placing the expected incisions within the relaxed skin tension lines where possible. The area thus outlined was incised deep to adipose tissue with a #15 scalpel blade.  The skin margins were undermined to an appropriate distance in all directions around the primary defect and laterally outward around the island pedicle utilizing iris scissors.  There was minimal undermining beneath the pedicle flap. A suspension suture was placed in the canthal tendon to prevent tension and prevent ectropion.
Alar Island Pedicle Flap Text: The defect edges were debeveled with a #15 scalpel blade.  Given the location of the defect, shape of the defect and the proximity to the alar rim an island pedicle advancement flap was deemed most appropriate.  Using a sterile surgical marker, an appropriate advancement flap was drawn incorporating the defect, outlining the appropriate donor tissue and placing the expected incisions within the nasal ala running parallel to the alar rim. The area thus outlined was incised with a #15 scalpel blade.  The skin margins were undermined minimally to an appropriate distance in all directions around the primary defect and laterally outward around the island pedicle utilizing iris scissors.  There was minimal undermining beneath the pedicle flap.
Double Island Pedicle Flap Text: The defect edges were debeveled with a #15 scalpel blade.  Given the location of the defect, shape of the defect and the proximity to free margins a double island pedicle advancement flap was deemed most appropriate.  Using a sterile surgical marker, an appropriate advancement flap was drawn incorporating the defect, outlining the appropriate donor tissue and placing the expected incisions within the relaxed skin tension lines where possible.    The area thus outlined was incised deep to adipose tissue with a #15 scalpel blade.  The skin margins were undermined to an appropriate distance in all directions around the primary defect and laterally outward around the island pedicle utilizing iris scissors.  There was minimal undermining beneath the pedicle flap.
Island Pedicle Flap-Requiring Vessel Identification Text: The defect edges were debeveled with a #15 scalpel blade.  Given the location of the defect, shape of the defect and the proximity to free margins an island pedicle advancement flap was deemed most appropriate.  Using a sterile surgical marker, an appropriate advancement flap was drawn, based on the axial vessel mentioned above, incorporating the defect, outlining the appropriate donor tissue and placing the expected incisions within the relaxed skin tension lines where possible.    The area thus outlined was incised deep to adipose tissue with a #15 scalpel blade.  The skin margins were undermined to an appropriate distance in all directions around the primary defect and laterally outward around the island pedicle utilizing iris scissors.  There was minimal undermining beneath the pedicle flap.
Keystone Flap Text: The defect edges were debeveled with a #15 scalpel blade.  Given the location of the defect, shape of the defect a keystone flap was deemed most appropriate.  Using a sterile surgical marker, an appropriate keystone flap was drawn incorporating the defect, outlining the appropriate donor tissue and placing the expected incisions within the relaxed skin tension lines where possible. The area thus outlined was incised deep to adipose tissue with a #15 scalpel blade.  The skin margins were undermined to an appropriate distance in all directions around the primary defect and laterally outward around the flap utilizing iris scissors.
O-T Plasty Text: The defect edges were debeveled with a #15 scalpel blade.  Given the location of the defect, shape of the defect and the proximity to free margins an O-T plasty was deemed most appropriate.  Using a sterile surgical marker, an appropriate O-T plasty was drawn incorporating the defect and placing the expected incisions within the relaxed skin tension lines where possible.    The area thus outlined was incised deep to adipose tissue with a #15 scalpel blade.  The skin margins were undermined to an appropriate distance in all directions utilizing iris scissors.
O-Z Plasty Text: The defect edges were debeveled with a #15 scalpel blade.  Given the location of the defect, shape of the defect and the proximity to free margins an O-Z plasty (double transposition flap) was deemed most appropriate.  Using a sterile surgical marker, the appropriate transposition flaps were drawn incorporating the defect and placing the expected incisions within the relaxed skin tension lines where possible.    The area thus outlined was incised deep to adipose tissue with a #15 scalpel blade.  The skin margins were undermined to an appropriate distance in all directions utilizing iris scissors.  Hemostasis was achieved with electrocautery.  The flaps were then transposed into place, one clockwise and the other counterclockwise, and anchored with interrupted buried subcutaneous sutures.
Double O-Z Plasty Text: The defect edges were debeveled with a #15 scalpel blade.  Given the location of the defect, shape of the defect and the proximity to free margins a Double O-Z plasty (double transposition flap) was deemed most appropriate.  Using a sterile surgical marker, the appropriate transposition flaps were drawn incorporating the defect and placing the expected incisions within the relaxed skin tension lines where possible. The area thus outlined was incised deep to adipose tissue with a #15 scalpel blade.  The skin margins were undermined to an appropriate distance in all directions utilizing iris scissors.  Hemostasis was achieved with electrocautery.  The flaps were then transposed into place, one clockwise and the other counterclockwise, and anchored with interrupted buried subcutaneous sutures.
V-Y Plasty Text: The defect edges were debeveled with a #15 scalpel blade.  Given the location of the defect, shape of the defect and the proximity to free margins an V-Y advancement flap was deemed most appropriate.  Using a sterile surgical marker, an appropriate advancement flap was drawn incorporating the defect and placing the expected incisions within the relaxed skin tension lines where possible.    The area thus outlined was incised deep to adipose tissue with a #15 scalpel blade.  The skin margins were undermined to an appropriate distance in all directions utilizing iris scissors.
H Plasty Text: Given the location of the defect, shape of the defect and the proximity to free margins a H-plasty was deemed most appropriate for repair.  Using a sterile surgical marker, the appropriate advancement arms of the H-plasty were drawn incorporating the defect and placing the expected incisions within the relaxed skin tension lines where possible. The area thus outlined was incised deep to adipose tissue with a #15 scalpel blade. The skin margins were undermined to an appropriate distance in all directions utilizing iris scissors.  The opposing advancement arms were then advanced into place in opposite direction and anchored with interrupted buried subcutaneous sutures.
W Plasty Text: The lesion was extirpated to the level of the fat with a #15 scalpel blade.  Given the location of the defect, shape of the defect and the proximity to free margins a W-plasty was deemed most appropriate for repair.  Using a sterile surgical marker, the appropriate transposition arms of the W-plasty were drawn incorporating the defect and placing the expected incisions within the relaxed skin tension lines where possible.    The area thus outlined was incised deep to adipose tissue with a #15 scalpel blade.  The skin margins were undermined to an appropriate distance in all directions utilizing iris scissors.  The opposing transposition arms were then transposed into place in opposite direction and anchored with interrupted buried subcutaneous sutures.
Z Plasty Text: The lesion was extirpated to the level of the fat with a #15 scalpel blade.  Given the location of the defect, shape of the defect and the proximity to free margins a Z-plasty was deemed most appropriate for repair.  Using a sterile surgical marker, the appropriate transposition arms of the Z-plasty were drawn incorporating the defect and placing the expected incisions within the relaxed skin tension lines where possible.    The area thus outlined was incised deep to adipose tissue with a #15 scalpel blade.  The skin margins were undermined to an appropriate distance in all directions utilizing iris scissors.  The opposing transposition arms were then transposed into place in opposite direction and anchored with interrupted buried subcutaneous sutures.
Double Z Plasty Text: The lesion was extirpated to the level of the fat with a #15 scalpel blade. Given the location of the defect, shape of the defect and the proximity to free margins a double Z-plasty was deemed most appropriate for repair. Using a sterile surgical marker, the appropriate transposition arms of the double Z-plasty were drawn incorporating the defect and placing the expected incisions within the relaxed skin tension lines where possible. The area thus outlined was incised deep to adipose tissue with a #15 scalpel blade. The skin margins were undermined to an appropriate distance in all directions utilizing iris scissors. The opposing transposition arms were then transposed and carried over into place in opposite direction and anchored with interrupted buried subcutaneous sutures.
Zygomaticofacial Flap Text: Given the location of the defect, shape of the defect and the proximity to free margins a zygomaticofacial flap was deemed most appropriate for repair.  Using a sterile surgical marker, the appropriate flap was drawn incorporating the defect and placing the expected incisions within the relaxed skin tension lines where possible. The area thus outlined was incised deep to adipose tissue with a #15 scalpel blade with preservation of a vascular pedicle.  The skin margins were undermined to an appropriate distance in all directions utilizing iris scissors.  The flap was then placed into the defect and anchored with interrupted buried subcutaneous sutures.
Cheek Interpolation Flap Text: A decision was made to reconstruct the defect utilizing an interpolation axial flap and a staged reconstruction.  A telfa template was made of the defect.  This telfa template was then used to outline the Cheek Interpolation flap.  The donor area for the pedicle flap was then injected with anesthesia.  The flap was excised through the skin and subcutaneous tissue down to the layer of the underlying musculature.  The interpolation flap was carefully excised within this deep plane to maintain its blood supply.  The edges of the donor site were undermined.   The donor site was closed in a primary fashion.  The pedicle was then rotated into position and sutured.  Once the tube was sutured into place, adequate blood supply was confirmed with blanching and refill.  The pedicle was then wrapped with xeroform gauze and dressed appropriately with a telfa and gauze bandage to ensure continued blood supply and protect the attached pedicle.
Cheek-To-Nose Interpolation Flap Text: A decision was made to reconstruct the defect utilizing an interpolation axial flap and a staged reconstruction.  A telfa template was made of the defect.  This telfa template was then used to outline the Cheek-To-Nose Interpolation flap.  The donor area for the pedicle flap was then injected with anesthesia.  The flap was excised through the skin and subcutaneous tissue down to the layer of the underlying musculature.  The interpolation flap was carefully excised within this deep plane to maintain its blood supply.  The edges of the donor site were undermined.   The donor site was closed in a primary fashion.  The pedicle was then rotated into position and sutured.  Once the tube was sutured into place, adequate blood supply was confirmed with blanching and refill.  The pedicle was then wrapped with xeroform gauze and dressed appropriately with a telfa and gauze bandage to ensure continued blood supply and protect the attached pedicle.
Interpolation Flap Text: A decision was made to reconstruct the defect utilizing an interpolation axial flap and a staged reconstruction.  A telfa template was made of the defect.  This telfa template was then used to outline the interpolation flap.  The donor area for the pedicle flap was then injected with anesthesia.  The flap was excised through the skin and subcutaneous tissue down to the layer of the underlying musculature.  The interpolation flap was carefully excised within this deep plane to maintain its blood supply.  The edges of the donor site were undermined.   The donor site was closed in a primary fashion.  The pedicle was then rotated into position and sutured.  Once the tube was sutured into place, adequate blood supply was confirmed with blanching and refill.  The pedicle was then wrapped with xeroform gauze and dressed appropriately with a telfa and gauze bandage to ensure continued blood supply and protect the attached pedicle.
Melolabial Interpolation Flap Text: A decision was made to reconstruct the defect utilizing an interpolation axial flap and a staged reconstruction.  A telfa template was made of the defect.  This telfa template was then used to outline the melolabial interpolation flap.  The donor area for the pedicle flap was then injected with anesthesia.  The flap was excised through the skin and subcutaneous tissue down to the layer of the underlying musculature.  The pedicle flap was carefully excised within this deep plane to maintain its blood supply.  The edges of the donor site were undermined.   The donor site was closed in a primary fashion.  The pedicle was then rotated into position and sutured.  Once the tube was sutured into place, adequate blood supply was confirmed with blanching and refill.  The pedicle was then wrapped with xeroform gauze and dressed appropriately with a telfa and gauze bandage to ensure continued blood supply and protect the attached pedicle.
Mastoid Interpolation Flap Text: A decision was made to reconstruct the defect utilizing an interpolation axial flap and a staged reconstruction.  A telfa template was made of the defect.  This telfa template was then used to outline the mastoid interpolation flap.  The donor area for the pedicle flap was then injected with anesthesia.  The flap was excised through the skin and subcutaneous tissue down to the layer of the underlying musculature.  The pedicle flap was carefully excised within this deep plane to maintain its blood supply.  The edges of the donor site were undermined.   The donor site was closed in a primary fashion.  The pedicle was then rotated into position and sutured.  Once the tube was sutured into place, adequate blood supply was confirmed with blanching and refill.  The pedicle was then wrapped with xeroform gauze and dressed appropriately with a telfa and gauze bandage to ensure continued blood supply and protect the attached pedicle.
Posterior Auricular Interpolation Flap Text: A decision was made to reconstruct the defect utilizing an interpolation axial flap and a staged reconstruction.  A telfa template was made of the defect.  This telfa template was then used to outline the posterior auricular interpolation flap.  The donor area for the pedicle flap was then injected with anesthesia.  The flap was excised through the skin and subcutaneous tissue down to the layer of the underlying musculature.  The pedicle flap was carefully excised within this deep plane to maintain its blood supply.  The edges of the donor site were undermined.   The donor site was closed in a primary fashion.  The pedicle was then rotated into position and sutured.  Once the tube was sutured into place, adequate blood supply was confirmed with blanching and refill.  The pedicle was then wrapped with xeroform gauze and dressed appropriately with a telfa and gauze bandage to ensure continued blood supply and protect the attached pedicle.
Paramedian Forehead Flap Text: A decision was made to reconstruct the defect utilizing an interpolation axial flap and a staged reconstruction.  A telfa template was made of the defect.  This telfa template was then used to outline the paramedian forehead pedicle flap.  The donor area for the pedicle flap was then injected with anesthesia.  The flap was excised through the skin and subcutaneous tissue down to the layer of the underlying musculature.  The pedicle flap was carefully excised within this deep plane to maintain its blood supply.  The edges of the donor site were undermined.   The donor site was closed in a primary fashion.  The pedicle was then rotated into position and sutured.  Once the tube was sutured into place, adequate blood supply was confirmed with blanching and refill.  The pedicle was then wrapped with xeroform gauze and dressed appropriately with a telfa and gauze bandage to ensure continued blood supply and protect the attached pedicle.
Abbe Flap (Upper To Lower Lip) Text: The defect of the lower lip was assessed and measured.  Given the location and size of the defect, an Abbe flap was deemed most appropriate.  Using a sterile surgical marker, an appropriate Abbe flap was measured and drawn on the upper lip. Local anesthesia was then infiltrated.  A scalpel was then used to incise the upper lip through and through the skin, vermilion, muscle and mucosa, leaving the flap pedicled on the opposite side.  The flap was then rotated and transferred to the lower lip defect.  The flap was then sutured into place with a three layer technique, closing the orbicularis oris muscle layer with subcutaneous buried sutures, followed by a mucosal layer and an epidermal layer.
Abbe Flap (Lower To Upper Lip) Text: The defect of the upper lip was assessed and measured.  Given the location and size of the defect, an Abbe flap was deemed most appropriate.  Using a sterile surgical marker, an appropriate Abbe flap was measured and drawn on the lower lip. Local anesthesia was then infiltrated. A scalpel was then used to incise the upper lip through and through the skin, vermilion, muscle and mucosa, leaving the flap pedicled on the opposite side.  The flap was then rotated and transferred to the lower lip defect.  The flap was then sutured into place with a three layer technique, closing the orbicularis oris muscle layer with subcutaneous buried sutures, followed by a mucosal layer and an epidermal layer.
Estlander Flap (Upper To Lower Lip) Text: The defect of the lower lip was assessed and measured.  Given the location and size of the defect, an Estlander flap was deemed most appropriate.  Using a sterile surgical marker, an appropriate Estlander flap was measured and drawn on the upper lip. Local anesthesia was then infiltrated. A scalpel was then used to incise the lateral aspect of the flap, through skin, muscle and mucosa, leaving the flap pedicled medially.  The flap was then rotated and positioned to fill the lower lip defect.  The flap was then sutured into place with a three layer technique, closing the orbicularis oris muscle layer with subcutaneous buried sutures, followed by a mucosal layer and an epidermal layer.
Lip Wedge Excision Repair Text: Given the location of the defect and the proximity to free margins a full thickness wedge repair was deemed most appropriate.  Using a sterile surgical marker, the appropriate repair was drawn incorporating the defect and placing the expected incisions perpendicular to the vermilion border.  The vermilion border was also meticulously outlined to ensure appropriate reapproximation during the repair.  The area thus outlined was incised through and through with a #15 scalpel blade.  The muscularis and dermis were reaproximated with deep sutures following hemostasis. Care was taken to realign the vermilion border before proceeding with the superficial closure.  Once the vermilion was realigned the superfical and mucosal closure was finished.
Ftsg Text: The defect edges were debeveled with a #15 scalpel blade.  Given the location of the defect, shape of the defect and the proximity to free margins a full thickness skin graft was deemed most appropriate.  Using a sterile surgical marker, the primary defect shape was transferred to the donor site. The area thus outlined was incised deep to adipose tissue with a #15 scalpel blade.  The harvested graft was then trimmed of adipose tissue until only dermis and epidermis was left.  The skin margins of the secondary defect were undermined to an appropriate distance in all directions utilizing iris scissors.  The secondary defect was closed with interrupted buried subcutaneous sutures.  The skin edges were then re-apposed with running  sutures.  The skin graft was then placed in the primary defect and oriented appropriately.
Split-Thickness Skin Graft Text: The defect edges were debeveled with a #15 scalpel blade.  Given the location of the defect, shape of the defect and the proximity to free margins a split thickness skin graft was deemed most appropriate.  Using a sterile surgical marker, the primary defect shape was transferred to the donor site. The split thickness graft was then harvested.  The skin graft was then placed in the primary defect and oriented appropriately.
Pinch Graft Text: The defect edges were debeveled with a #15 scalpel blade. Given the location of the defect, shape of the defect and the proximity to free margins a pinch graft was deemed most appropriate. Using a sterile surgical marker, the primary defect shape was transferred to the donor site. The area thus outlined was incised deep to adipose tissue with a #15 scalpel blade.  The harvested graft was then trimmed of adipose tissue until only dermis and epidermis was left. The skin margins of the secondary defect were undermined to an appropriate distance in all directions utilizing iris scissors.  The secondary defect was closed with interrupted buried subcutaneous sutures.  The skin edges were then re-apposed with running  sutures.  The skin graft was then placed in the primary defect and oriented appropriately.
Burow's Graft Text: The defect edges were debeveled with a #15 scalpel blade.  Given the location of the defect, shape of the defect, the proximity to free margins and the presence of a standing cone deformity a Burow's skin graft was deemed most appropriate. The standing cone was removed and this tissue was then trimmed to the shape of the primary defect. The adipose tissue was also removed until only dermis and epidermis were left.  The skin margins of the secondary defect were undermined to an appropriate distance in all directions utilizing iris scissors.  The secondary defect was closed with interrupted buried subcutaneous sutures.  The skin edges were then re-apposed with running  sutures.  The skin graft was then placed in the primary defect and oriented appropriately.
Cartilage Graft Text: The defect edges were debeveled with a #15 scalpel blade.  Given the location of the defect, shape of the defect, the fact the defect involved a full thickness cartilage defect a cartilage graft was deemed most appropriate.  An appropriate donor site was identified, cleansed, and anesthetized. The cartilage graft was then harvested and transferred to the recipient site, oriented appropriately and then sutured into place.  The secondary defect was then repaired using a primary closure.
Composite Graft Text: The defect edges were debeveled with a #15 scalpel blade.  Given the location of the defect, shape of the defect, the proximity to free margins and the fact the defect was full thickness a composite graft was deemed most appropriate.  The defect was outline and then transferred to the donor site.  A full thickness graft was then excised from the donor site. The graft was then placed in the primary defect, oriented appropriately and then sutured into place.  The secondary defect was then repaired using a primary closure.
Epidermal Autograft Text: The defect edges were debeveled with a #15 scalpel blade.  Given the location of the defect, shape of the defect and the proximity to free margins an epidermal autograft was deemed most appropriate.  Using a sterile surgical marker, the primary defect shape was transferred to the donor site. The epidermal graft was then harvested.  The skin graft was then placed in the primary defect and oriented appropriately.
Dermal Autograft Text: The defect edges were debeveled with a #15 scalpel blade.  Given the location of the defect, shape of the defect and the proximity to free margins a dermal autograft was deemed most appropriate.  Using a sterile surgical marker, the primary defect shape was transferred to the donor site. The area thus outlined was incised deep to adipose tissue with a #15 scalpel blade.  The harvested graft was then trimmed of adipose and epidermal tissue until only dermis was left.  The skin graft was then placed in the primary defect and oriented appropriately.
Skin Substitute Text: The defect edges were debeveled with a #15 scalpel blade.  Given the location of the defect, shape of the defect and the proximity to free margins a skin substitute graft was deemed most appropriate.  The graft material was trimmed to fit the size of the defect. The graft was then placed in the primary defect and oriented appropriately.
Tissue Cultured Epidermal Autograft Text: The defect edges were debeveled with a #15 scalpel blade.  Given the location of the defect, shape of the defect and the proximity to free margins a tissue cultured epidermal autograft was deemed most appropriate.  The graft was then trimmed to fit the size of the defect.  The graft was then placed in the primary defect and oriented appropriately.
Xenograft Text: The defect edges were debeveled with a #15 scalpel blade.  Given the location of the defect, shape of the defect and the proximity to free margins a xenograft was deemed most appropriate.  The graft was then trimmed to fit the size of the defect.  The graft was then placed in the primary defect and oriented appropriately.
Purse String (Intermediate) Text: Given the location of the defect and the characteristics of the surrounding skin a purse string intermediate closure was deemed most appropriate.  Undermining was performed circumferentially around the surgical defect.  A purse string suture was then placed and tightened.
Purse String (Simple) Text: Given the location of the defect and the characteristics of the surrounding skin a purse string simple closure was deemed most appropriate.  Undermining was performed circumferentially around the surgical defect.  A purse string suture was then placed and tightened.
Complex Repair And Single Advancement Flap Text: The defect edges were debeveled with a #15 scalpel blade.  The primary defect was closed partially with a complex linear closure.  Given the location of the remaining defect, shape of the defect and the proximity to free margins a single advancement flap was deemed most appropriate for complete closure of the defect.  Using a sterile surgical marker, an appropriate advancement flap was drawn incorporating the defect and placing the expected incisions within the relaxed skin tension lines where possible.    The area thus outlined was incised deep to adipose tissue with a #15 scalpel blade.  The skin margins were undermined to an appropriate distance in all directions utilizing iris scissors.
Complex Repair And Double Advancement Flap Text: The defect edges were debeveled with a #15 scalpel blade.  The primary defect was closed partially with a complex linear closure.  Given the location of the remaining defect, shape of the defect and the proximity to free margins a double advancement flap was deemed most appropriate for complete closure of the defect.  Using a sterile surgical marker, an appropriate advancement flap was drawn incorporating the defect and placing the expected incisions within the relaxed skin tension lines where possible.    The area thus outlined was incised deep to adipose tissue with a #15 scalpel blade.  The skin margins were undermined to an appropriate distance in all directions utilizing iris scissors.
Complex Repair And Modified Advancement Flap Text: The defect edges were debeveled with a #15 scalpel blade.  The primary defect was closed partially with a complex linear closure.  Given the location of the remaining defect, shape of the defect and the proximity to free margins a modified advancement flap was deemed most appropriate for complete closure of the defect.  Using a sterile surgical marker, an appropriate advancement flap was drawn incorporating the defect and placing the expected incisions within the relaxed skin tension lines where possible.    The area thus outlined was incised deep to adipose tissue with a #15 scalpel blade.  The skin margins were undermined to an appropriate distance in all directions utilizing iris scissors.
Complex Repair And A-T Advancement Flap Text: The defect edges were debeveled with a #15 scalpel blade.  The primary defect was closed partially with a complex linear closure.  Given the location of the remaining defect, shape of the defect and the proximity to free margins an A-T advancement flap was deemed most appropriate for complete closure of the defect.  Using a sterile surgical marker, an appropriate advancement flap was drawn incorporating the defect and placing the expected incisions within the relaxed skin tension lines where possible.    The area thus outlined was incised deep to adipose tissue with a #15 scalpel blade.  The skin margins were undermined to an appropriate distance in all directions utilizing iris scissors.
Complex Repair And O-T Advancement Flap Text: The defect edges were debeveled with a #15 scalpel blade.  The primary defect was closed partially with a complex linear closure.  Given the location of the remaining defect, shape of the defect and the proximity to free margins an O-T advancement flap was deemed most appropriate for complete closure of the defect.  Using a sterile surgical marker, an appropriate advancement flap was drawn incorporating the defect and placing the expected incisions within the relaxed skin tension lines where possible.    The area thus outlined was incised deep to adipose tissue with a #15 scalpel blade.  The skin margins were undermined to an appropriate distance in all directions utilizing iris scissors.
Complex Repair And O-L Flap Text: The defect edges were debeveled with a #15 scalpel blade.  The primary defect was closed partially with a complex linear closure.  Given the location of the remaining defect, shape of the defect and the proximity to free margins an O-L flap was deemed most appropriate for complete closure of the defect.  Using a sterile surgical marker, an appropriate flap was drawn incorporating the defect and placing the expected incisions within the relaxed skin tension lines where possible.    The area thus outlined was incised deep to adipose tissue with a #15 scalpel blade.  The skin margins were undermined to an appropriate distance in all directions utilizing iris scissors.
Complex Repair And Bilobe Flap Text: The defect edges were debeveled with a #15 scalpel blade.  The primary defect was closed partially with a complex linear closure.  Given the location of the remaining defect, shape of the defect and the proximity to free margins a bilobe flap was deemed most appropriate for complete closure of the defect.  Using a sterile surgical marker, an appropriate advancement flap was drawn incorporating the defect and placing the expected incisions within the relaxed skin tension lines where possible.    The area thus outlined was incised deep to adipose tissue with a #15 scalpel blade.  The skin margins were undermined to an appropriate distance in all directions utilizing iris scissors.
Complex Repair And Melolabial Flap Text: The defect edges were debeveled with a #15 scalpel blade.  The primary defect was closed partially with a complex linear closure.  Given the location of the remaining defect, shape of the defect and the proximity to free margins a melolabial flap was deemed most appropriate for complete closure of the defect.  Using a sterile surgical marker, an appropriate advancement flap was drawn incorporating the defect and placing the expected incisions within the relaxed skin tension lines where possible.    The area thus outlined was incised deep to adipose tissue with a #15 scalpel blade.  The skin margins were undermined to an appropriate distance in all directions utilizing iris scissors.
Complex Repair And Rotation Flap Text: The defect edges were debeveled with a #15 scalpel blade.  The primary defect was closed partially with a complex linear closure.  Given the location of the remaining defect, shape of the defect and the proximity to free margins a rotation flap was deemed most appropriate for complete closure of the defect.  Using a sterile surgical marker, an appropriate advancement flap was drawn incorporating the defect and placing the expected incisions within the relaxed skin tension lines where possible.    The area thus outlined was incised deep to adipose tissue with a #15 scalpel blade.  The skin margins were undermined to an appropriate distance in all directions utilizing iris scissors.
Complex Repair And Rhombic Flap Text: The defect edges were debeveled with a #15 scalpel blade.  The primary defect was closed partially with a complex linear closure.  Given the location of the remaining defect, shape of the defect and the proximity to free margins a rhombic flap was deemed most appropriate for complete closure of the defect.  Using a sterile surgical marker, an appropriate advancement flap was drawn incorporating the defect and placing the expected incisions within the relaxed skin tension lines where possible.    The area thus outlined was incised deep to adipose tissue with a #15 scalpel blade.  The skin margins were undermined to an appropriate distance in all directions utilizing iris scissors.
Complex Repair And Transposition Flap Text: The defect edges were debeveled with a #15 scalpel blade.  The primary defect was closed partially with a complex linear closure.  Given the location of the remaining defect, shape of the defect and the proximity to free margins a transposition flap was deemed most appropriate for complete closure of the defect.  Using a sterile surgical marker, an appropriate advancement flap was drawn incorporating the defect and placing the expected incisions within the relaxed skin tension lines where possible.    The area thus outlined was incised deep to adipose tissue with a #15 scalpel blade.  The skin margins were undermined to an appropriate distance in all directions utilizing iris scissors.
Complex Repair And V-Y Plasty Text: The defect edges were debeveled with a #15 scalpel blade.  The primary defect was closed partially with a complex linear closure.  Given the location of the remaining defect, shape of the defect and the proximity to free margins a V-Y plasty was deemed most appropriate for complete closure of the defect.  Using a sterile surgical marker, an appropriate advancement flap was drawn incorporating the defect and placing the expected incisions within the relaxed skin tension lines where possible.    The area thus outlined was incised deep to adipose tissue with a #15 scalpel blade.  The skin margins were undermined to an appropriate distance in all directions utilizing iris scissors.
Complex Repair And M Plasty Text: The defect edges were debeveled with a #15 scalpel blade.  The primary defect was closed partially with a complex linear closure.  Given the location of the remaining defect, shape of the defect and the proximity to free margins an M plasty was deemed most appropriate for complete closure of the defect.  Using a sterile surgical marker, an appropriate advancement flap was drawn incorporating the defect and placing the expected incisions within the relaxed skin tension lines where possible.    The area thus outlined was incised deep to adipose tissue with a #15 scalpel blade.  The skin margins were undermined to an appropriate distance in all directions utilizing iris scissors.
Complex Repair And Double M Plasty Text: The defect edges were debeveled with a #15 scalpel blade.  The primary defect was closed partially with a complex linear closure.  Given the location of the remaining defect, shape of the defect and the proximity to free margins a double M plasty was deemed most appropriate for complete closure of the defect.  Using a sterile surgical marker, an appropriate advancement flap was drawn incorporating the defect and placing the expected incisions within the relaxed skin tension lines where possible.    The area thus outlined was incised deep to adipose tissue with a #15 scalpel blade.  The skin margins were undermined to an appropriate distance in all directions utilizing iris scissors.
Complex Repair And W Plasty Text: The defect edges were debeveled with a #15 scalpel blade.  The primary defect was closed partially with a complex linear closure.  Given the location of the remaining defect, shape of the defect and the proximity to free margins a W plasty was deemed most appropriate for complete closure of the defect.  Using a sterile surgical marker, an appropriate advancement flap was drawn incorporating the defect and placing the expected incisions within the relaxed skin tension lines where possible.    The area thus outlined was incised deep to adipose tissue with a #15 scalpel blade.  The skin margins were undermined to an appropriate distance in all directions utilizing iris scissors.
Complex Repair And Z Plasty Text: The defect edges were debeveled with a #15 scalpel blade.  The primary defect was closed partially with a complex linear closure.  Given the location of the remaining defect, shape of the defect and the proximity to free margins a Z plasty was deemed most appropriate for complete closure of the defect.  Using a sterile surgical marker, an appropriate advancement flap was drawn incorporating the defect and placing the expected incisions within the relaxed skin tension lines where possible.    The area thus outlined was incised deep to adipose tissue with a #15 scalpel blade.  The skin margins were undermined to an appropriate distance in all directions utilizing iris scissors.
Complex Repair And Dorsal Nasal Flap Text: The defect edges were debeveled with a #15 scalpel blade.  The primary defect was closed partially with a complex linear closure.  Given the location of the remaining defect, shape of the defect and the proximity to free margins a dorsal nasal flap was deemed most appropriate for complete closure of the defect.  Using a sterile surgical marker, an appropriate flap was drawn incorporating the defect and placing the expected incisions within the relaxed skin tension lines where possible.    The area thus outlined was incised deep to adipose tissue with a #15 scalpel blade.  The skin margins were undermined to an appropriate distance in all directions utilizing iris scissors.
Complex Repair And Ftsg Text: The defect edges were debeveled with a #15 scalpel blade.  The primary defect was closed partially with a complex linear closure.  Given the location of the defect, shape of the defect and the proximity to free margins a full thickness skin graft was deemed most appropriate to repair the remaining defect.  The graft was trimmed to fit the size of the remaining defect.  The graft was then placed in the primary defect, oriented appropriately, and sutured into place.
Complex Repair And Burow's Graft Text: The defect edges were debeveled with a #15 scalpel blade.  The primary defect was closed partially with a complex linear closure.  Given the location of the defect, shape of the defect, the proximity to free margins and the presence of a standing cone deformity a Burow's graft was deemed most appropriate to repair the remaining defect.  The graft was trimmed to fit the size of the remaining defect.  The graft was then placed in the primary defect, oriented appropriately, and sutured into place.
Complex Repair And Split-Thickness Skin Graft Text: The defect edges were debeveled with a #15 scalpel blade.  The primary defect was closed partially with a complex linear closure.  Given the location of the defect, shape of the defect and the proximity to free margins a split thickness skin graft was deemed most appropriate to repair the remaining defect.  The graft was trimmed to fit the size of the remaining defect.  The graft was then placed in the primary defect, oriented appropriately, and sutured into place.
Complex Repair And Epidermal Autograft Text: The defect edges were debeveled with a #15 scalpel blade.  The primary defect was closed partially with a complex linear closure.  Given the location of the defect, shape of the defect and the proximity to free margins an epidermal autograft was deemed most appropriate to repair the remaining defect.  The graft was trimmed to fit the size of the remaining defect.  The graft was then placed in the primary defect, oriented appropriately, and sutured into place.
Complex Repair And Dermal Autograft Text: The defect edges were debeveled with a #15 scalpel blade.  The primary defect was closed partially with a complex linear closure.  Given the location of the defect, shape of the defect and the proximity to free margins an dermal autograft was deemed most appropriate to repair the remaining defect.  The graft was trimmed to fit the size of the remaining defect.  The graft was then placed in the primary defect, oriented appropriately, and sutured into place.
Complex Repair And Tissue Cultured Epidermal Autograft Text: The defect edges were debeveled with a #15 scalpel blade.  The primary defect was closed partially with a complex linear closure.  Given the location of the defect, shape of the defect and the proximity to free margins an tissue cultured epidermal autograft was deemed most appropriate to repair the remaining defect.  The graft was trimmed to fit the size of the remaining defect.  The graft was then placed in the primary defect, oriented appropriately, and sutured into place.
Complex Repair And Xenograft Text: The defect edges were debeveled with a #15 scalpel blade.  The primary defect was closed partially with a complex linear closure.  Given the location of the defect, shape of the defect and the proximity to free margins a xenograft was deemed most appropriate to repair the remaining defect.  The graft was trimmed to fit the size of the remaining defect.  The graft was then placed in the primary defect, oriented appropriately, and sutured into place.
Complex Repair And Skin Substitute Graft Text: The defect edges were debeveled with a #15 scalpel blade.  The primary defect was closed partially with a complex linear closure.  Given the location of the remaining defect, shape of the defect and the proximity to free margins a skin substitute graft was deemed most appropriate to repair the remaining defect.  The graft was trimmed to fit the size of the remaining defect.  The graft was then placed in the primary defect, oriented appropriately, and sutured into place.
Path Notes (To The Dermatopathologist): Please check margins.
Medical Necessity Clause: This procedure was medically necessary because the lesion that was treated was:
Consent was obtained from the patient. The risks and benefits to therapy were discussed in detail. Specifically, the risks of infection, scarring, bleeding, prolonged wound healing, incomplete removal, allergy to anesthesia, nerve injury and recurrence were addressed. Prior to the procedure, the treatment site was clearly identified and confirmed by the patient. All components of Universal Protocol/PAUSE Rule completed.
Post-Care Instructions: I reviewed with the patient in detail post-care instructions. Patient is not to engage in any heavy lifting, exercise, or swimming for the next 12 days. Should the patient develop any fevers, chills, bleeding, severe pain patient will contact the office immediately.
Home Suture Removal Text: Patient was provided a home suture removal kit and will remove their sutures at home.  If they have any questions or difficulties they will call the office.
Where Do You Want The Question To Include Opioid Counseling Located?: Case Summary Tab
Billing Type: Third-Party Bill
Information: Selecting Yes will display possible errors in your note based on the variables you have selected. This validation is only offered as a suggestion for you. PLEASE NOTE THAT THE VALIDATION TEXT WILL BE REMOVED WHEN YOU FINALIZE YOUR NOTE. IF YOU WANT TO FAX A PRELIMINARY NOTE YOU WILL NEED TO TOGGLE THIS TO 'NO' IF YOU DO NOT WANT IT IN YOUR FAXED NOTE.

## 2023-08-25 ENCOUNTER — TELEPHONE (OUTPATIENT)
Dept: NEUROLOGY | Facility: CLINIC | Age: 53
End: 2023-08-25

## 2023-08-25 NOTE — TELEPHONE ENCOUNTER
Received  transcription:    Hi, this is [facility]. I am a patient of Dr. Ana Bundy. My YOB: 1970. I was calling just to check that it would be okay for me to take the doxycycline 100 mg. My doctor gave it to me yesterday and I was reading online and I guess I was conflicted if it was okay to take. If I am not available to answer, please just leave a message. Thank you.  -------------------------------------------------    Spoke with pt and she says she was at the dermatologist and they removed something so they gave her abx for prevention. Says she took last night but then started researching ([employer]) and saw something online that was against medication but also saw some support for medication as well. Pt thought she would just get Dr Rosa M Kaiser opinion about it to be sure. Dr. Cristina Sinclair - Please advise on doxycycline. [facility] [phone], ok to leave detailed message.

## 2023-08-25 NOTE — TELEPHONE ENCOUNTER
Patient is to proceed with doxycycline as originally recommended, no discontinuation of the teriflunomide required

## 2023-08-25 NOTE — TELEPHONE ENCOUNTER
Patient called back to discuss MS Letter. Patient first mentioned that she read her office visit note and found some discrepancies in it. Suggested patient send a message to the provider with what she feels should be changed. Patient spoke with her boss, however not sure how supportive she is. 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 There is a disability program for short term but patient was not provided all the details. Patient's boss to talk to the board of the company and get further information for patient. Patient was working 3 days per week and coming home, needing to sleep the rest of the day. Summer program for work is less involved, and because there has been a break in between, patient has been making progress in feeling better. Patient would like a letter stating her diagnosis, symptoms she experiences more similarly to a accommodations letter. RICK to assist in drafting a letter for review. SW remains available.

## 2023-08-25 NOTE — TELEPHONE ENCOUNTER
Spoke with pt and advised her of Dr. Mike Decker response and recommendations. Pt verbalizes understanding and appreciative of call back.

## 2023-08-28 ENCOUNTER — HOSPITAL ENCOUNTER (OUTPATIENT)
Dept: MRI IMAGING | Facility: HOSPITAL | Age: 53
Discharge: HOME | End: 2023-08-28
Payer: COMMERCIAL

## 2023-08-28 DIAGNOSIS — G35 MS (MULTIPLE SCLEROSIS) (HCC): ICD-10-CM

## 2023-08-28 PROCEDURE — 70553 MRI BRAIN STEM W/O & W/DYE: CPT

## 2023-08-28 PROCEDURE — A9585 GADOBUTROL INJECTION: HCPCS | Performed by: PHYSICIAN ASSISTANT

## 2023-08-28 PROCEDURE — G1004 CDSM NDSC: HCPCS

## 2023-08-28 RX ORDER — GADOBUTROL 604.72 MG/ML
6 INJECTION INTRAVENOUS
Status: COMPLETED | OUTPATIENT
Start: 2023-08-28 | End: 2023-08-28

## 2023-08-28 RX ADMIN — GADOBUTROL 6 ML: 604.72 INJECTION INTRAVENOUS at 07:48

## 2023-08-28 NOTE — TELEPHONE ENCOUNTER
[facility] ASH Amaral  [facility] [id number] [place], [place] [address]  [facility]  [phone]  FAX # [phone]            2023  To Whom it May Concern,     Darina Schlatter ( [birth year]) is a patient under my care for the diagnosis of Multiple Sclerosis. Autumn Parada was diagnosed with Multiple Sclerosis in 2022, but likely had symptoms since . Multiple Sclerosis is a chronic demyelinating condition that causes a patient’s immune system to aggressively attack the coverage of the neuronal cells, called myelin, causing transient neurologic deficit. There are multiple subtypes of multiple sclerosis. Patients diagnosed with multiple sclerosis may experience fatigue, intermittent muscle spasms, vision problems, numbness, weakness in arms and legs and/or balance issues. Establishing a diagnosis of multiple sclerosis does not mean the patient has to stop working, but work accommodations might be required based on the patient’s job description, Functional Capacity Evaluation (FCE) and/or neuropsychologic evaluation results. Autumn Parada experiences balance difficulties, local sensory dysfunction, numbness at times in her extremities, word finding difficulties, fatigue, and incontinence of bowel and bladder. I recommended that some additional accommodations be made due to the status of her health. Autumn Parada will need to have additional time allowed for walking due to her weakness. Ms. Marty Reeves would also benefit from being able to have additional time for bathroom breaks. Stress can cause old or new MS symptoms to occur. Patient may need to utilize sick time when symptoms persist.  The [address] [place] [address] [employer]) is a helpful resource that can be used when making work accommodations for persons diagnosed with multiple sclerosis. You can visit their website at [employer] or call them at [phone].      For more information about Multiple sclerosis, please visit the [facility] MS Society’s website at [address] can also call them at [phone].      Sincerely,     Krys Rosado MD   MS Specialist  [facility]

## 2023-08-28 NOTE — TELEPHONE ENCOUNTER
Patient called in, read letter. Requested letter say "bowel and bladder issues" as opposed to incontinence. Also requested heat sensitivity be added to symptoms. SW made change. [facility] ASH Amaral  [facility] [id number] [place] [address]  [facility]  [phone]  FAX # [phone]            2023  To Whom it May Concern,     Deanna Bonilla ( [birth year]) is a patient under my care for the diagnosis of Multiple Sclerosis. Corinna Garcia was diagnosed with Multiple Sclerosis in 2022, but likely had symptoms since . Multiple Sclerosis is a chronic demyelinating condition that causes a patient’s immune system to aggressively attack the coverage of the neuronal cells, called myelin, causing transient neurologic deficit. There are multiple subtypes of multiple sclerosis. Patients diagnosed with multiple sclerosis may experience fatigue, intermittent muscle spasms, vision problems, numbness, weakness in arms and legs and/or balance issues. Establishing a diagnosis of multiple sclerosis does not mean the patient has to stop working, but work accommodations might be required based on the patient’s job description, Functional Capacity Evaluation (FCE) and/or neuropsychologic evaluation results. Corinna Garcia experiences balance difficulties, local sensory dysfunction, numbness at times in her extremities, word finding difficulties, fatigue, heat sensitivity, and bowel and bladder issues. I recommended that some additional accommodations be made due to the status of her health. Corinna Garcia will need to have additional time allowed for walking due to her weakness. Ms. Unruly Rosa would also benefit from being able to have additional time for bathroom breaks. Stress can cause old or new MS symptoms to occur.   Patient may need to utilize sick time when symptoms persist.  The [facility] [employer] [address] [place] [address] MALINA.[address]) is a helpful resource that can be used when making work accommodations for persons diagnosed with multiple sclerosis. You can visit their website at [email or web address] [place]. [facility] or call them at [phone]. For more information about Multiple sclerosis, please visit the National MS Society’s website at [address] can also call them at [phone].      Sincerely,     Lan Reynolds MD   MS Specialist  [facility] Neuroscience

## 2023-09-01 ENCOUNTER — TELEPHONE (OUTPATIENT)
Dept: NEUROLOGY | Facility: CLINIC | Age: 53
End: 2023-09-01

## 2023-09-01 NOTE — TELEPHONE ENCOUNTER
Received VM transcription from 10:48 AM:    Hi, this is Chloé Dodson. Date of birth, August 3rd, 1970. I'm a patient of Dr. Reinoso. I had an MRI done and the results are in my portal. I see that they have not been reviewed by staff yet, but I was wondering if the doctor would be calling me to discuss those. Or if I could set up a time to... I don't know if I need an appointment, I don't know how it works. But I would really like to know the results of the MRI. My number is [phone]. If someone could please let me know what direction to take. Thank you.  ----------------------------------------------    Spoke with pt and informed her that we are still awaiting a response from Dr. DOMINGUEZ and that when she does, we will call pt back.    Dr. DOMINGUEZ - MRI results available for your review. Please advise.

## 2023-09-01 NOTE — TELEPHONE ENCOUNTER
Patient left voicemail requesting call back to discuss MRI results.     [phone].       - please advise on MRI findings.

## 2023-09-05 ENCOUNTER — TELEPHONE (OUTPATIENT)
Dept: NEUROLOGY | Facility: CLINIC | Age: 53
End: 2023-09-05

## 2023-09-05 NOTE — TELEPHONE ENCOUNTER
Spoke w/pt. Advised her of results below. Pt verbalized understanding. LOV - 8/18/23 VV  Pt questioning if any need to change DMT? Or if this could just be d/t new MRI machine at [facility] providing more precise imaging (as suggested in report). Patient wanted to make you aware that she did resume taking Prozac appx one week ago. Dr. Joe Cotto - please advise on bolded question. Thank you!

## 2023-09-05 NOTE — TELEPHONE ENCOUNTER
----- Message from Hilary Leach MD sent at 9/1/2023  1:16 PM EDT -----  Please call the patient regarding Radiographic MS progression since September 2022 till August 2023- might be difference in MRI technique and not a true progression.

## 2023-09-05 NOTE — TELEPHONE ENCOUNTER
Will close task at this time. No further questions or needs at this time. SW will be available for future social needs as requested.

## 2023-09-07 ENCOUNTER — APPOINTMENT (RX ONLY)
Dept: URBAN - NONMETROPOLITAN AREA CLINIC 4 | Facility: CLINIC | Age: 53
Setting detail: DERMATOLOGY
End: 2023-09-07

## 2023-09-07 DIAGNOSIS — Z48.817 ENCOUNTER FOR SURGICAL AFTERCARE FOLLOWING SURGERY ON THE SKIN AND SUBCUTANEOUS TISSUE: ICD-10-CM

## 2023-09-07 PROCEDURE — ? SUTURE REMOVAL

## 2023-09-07 PROCEDURE — ? PHOTO-DOCUMENTATION

## 2023-09-07 PROCEDURE — ? TREATMENT REGIMEN

## 2023-09-07 ASSESSMENT — LOCATION ZONE DERM: LOCATION ZONE: ARM

## 2023-09-07 ASSESSMENT — LOCATION DETAILED DESCRIPTION DERM: LOCATION DETAILED: LEFT PROXIMAL POSTERIOR UPPER ARM

## 2023-09-07 ASSESSMENT — LOCATION SIMPLE DESCRIPTION DERM: LOCATION SIMPLE: LEFT POSTERIOR UPPER ARM

## 2023-09-07 NOTE — PROCEDURE: SUTURE REMOVAL
Body Location Override (Optional - Billing Will Still Be Based On Selected Body Map Location If Applicable): Left proximal posterior upper arm
Detail Level: Detailed
Add 1585x Cpt? (Do Not Bill If You Billed For The Procedure Placing The Sutures. This Is An Add-On Code That Must Be Billed With An E/M Visit Code): No

## 2023-10-05 ENCOUNTER — APPOINTMENT (RX ONLY)
Dept: URBAN - NONMETROPOLITAN AREA CLINIC 4 | Facility: CLINIC | Age: 53
Setting detail: DERMATOLOGY
End: 2023-10-05

## 2023-10-05 DIAGNOSIS — Z48.817 ENCOUNTER FOR SURGICAL AFTERCARE FOLLOWING SURGERY ON THE SKIN AND SUBCUTANEOUS TISSUE: ICD-10-CM

## 2023-10-05 PROCEDURE — ? PHOTO-DOCUMENTATION

## 2023-10-05 PROCEDURE — ? COUNSELING

## 2023-10-05 PROCEDURE — 99212 OFFICE O/P EST SF 10 MIN: CPT

## 2023-10-05 ASSESSMENT — LOCATION DETAILED DESCRIPTION DERM: LOCATION DETAILED: LEFT PROXIMAL POSTERIOR UPPER ARM

## 2023-10-05 ASSESSMENT — LOCATION ZONE DERM: LOCATION ZONE: ARM

## 2023-10-05 ASSESSMENT — LOCATION SIMPLE DESCRIPTION DERM: LOCATION SIMPLE: LEFT POSTERIOR UPPER ARM

## 2023-10-13 ENCOUNTER — TELEPHONE (OUTPATIENT)
Dept: UROLOGY | Facility: CLINIC | Age: 53
End: 2023-10-13

## 2023-10-13 NOTE — TELEPHONE ENCOUNTER
Voicemail left for the patient stating her 10/31/2023 appointment with Dr Alan Lozoya needs to be rescheduled. Patient lives in [place], please offer [facility] office, maybe closer. If patient wishes an appointment in [facility], please offer Miriam Sullivan on Fridays at [facility] or Tito Cabral at [facility] office on Fridays beginning 11/3/2023.   Thanks

## 2023-10-16 NOTE — TELEPHONE ENCOUNTER
Called patient x2 and left a voicemail for the patient stating her 10/31/2023 appointment with Dr. Chari Dancer needs to be rescheduled. Left office number for pt to call back and reschedule apt.

## 2023-10-16 NOTE — TELEPHONE ENCOUNTER
Spoke with patient and made her aware appointment with Dr. Maureen Gonzalez on 10/31 was cancelled and needs to be re-scheduled. Patient agreeable to appointment in the [facility] office which would be closer to her home. Patient states she has MS and her Neurologist wanted her to be seen by Urology due to neurogenic bladder and urinary urgency. Patient reports urinating without difficulty at this time- states she had an MS flare up and had a lot of urgency, however this has resolved. Patient requested appointment be scheduled for December, after patient meets with Neurology again. Non urgent new patient appointment scheduled with Chang Summers in [facility] and patient will access [employer] [facility] for office address.

## 2023-10-27 DIAGNOSIS — G37.9 CNS DEMYELINATION (HCC): ICD-10-CM

## 2023-10-27 DIAGNOSIS — R29.898 RIGHT ARM WEAKNESS: ICD-10-CM

## 2023-10-27 DIAGNOSIS — F43.22 ADJUSTMENT DISORDER WITH ANXIOUS MOOD: ICD-10-CM

## 2023-10-30 RX ORDER — FLUOXETINE 10 MG/1
CAPSULE ORAL
Qty: 30 CAPSULE | Refills: 1 | Status: SHIPPED | OUTPATIENT
Start: 2023-10-30

## 2023-11-29 ENCOUNTER — TELEMEDICINE (OUTPATIENT)
Dept: NEUROLOGY | Facility: CLINIC | Age: 53
End: 2023-11-29
Payer: COMMERCIAL

## 2023-11-29 ENCOUNTER — TELEPHONE (OUTPATIENT)
Dept: NEUROLOGY | Facility: CLINIC | Age: 53
End: 2023-11-29

## 2023-11-29 DIAGNOSIS — G35 MS (MULTIPLE SCLEROSIS) (HCC): Primary | ICD-10-CM

## 2023-11-29 DIAGNOSIS — R25.1 TREMOR: ICD-10-CM

## 2023-11-29 DIAGNOSIS — R29.898 RIGHT ARM WEAKNESS: ICD-10-CM

## 2023-11-29 DIAGNOSIS — R53.82 CHRONIC FATIGUE: ICD-10-CM

## 2023-11-29 PROCEDURE — 99215 OFFICE O/P EST HI 40 MIN: CPT | Performed by: PSYCHIATRY & NEUROLOGY

## 2023-11-29 RX ORDER — ERGOCALCIFEROL 1.25 MG/1
50000 CAPSULE ORAL WEEKLY
Qty: 12 CAPSULE | Refills: 0 | Status: SHIPPED | OUTPATIENT
Start: 2023-11-29 | End: 2023-12-04 | Stop reason: SDUPTHER

## 2023-11-29 RX ORDER — DALFAMPRIDINE 10 MG/1
10 TABLET, FILM COATED, EXTENDED RELEASE ORAL 2 TIMES DAILY
Qty: 60 TABLET | Refills: 5 | Status: SHIPPED | OUTPATIENT
Start: 2023-11-29 | End: 2023-12-04 | Stop reason: SDUPTHER

## 2023-11-29 RX ORDER — CYANOCOBALAMIN 1000 UG/ML
INJECTION, SOLUTION INTRAMUSCULAR; SUBCUTANEOUS
Qty: 9 ML | Refills: 0 | Status: SHIPPED | OUTPATIENT
Start: 2023-11-29 | End: 2023-12-04 | Stop reason: SDUPTHER

## 2023-11-29 NOTE — TELEPHONE ENCOUNTER
Patient was offered dalfampridine 10 mg twice daily-please proceed with prior authorization if required

## 2023-11-29 NOTE — PROGRESS NOTES
Virtual Regular Visit    Verification of patient location:    Patient is located at Other in the following state in which I hold an active license [place]      Assessment/Plan:    Problem List Items Addressed This Visit          Nervous and Auditory    MS (multiple sclerosis) (HCC) - Primary    Relevant Medications    Dalfampridine ER 10 MG TB12    ergocalciferol (VITAMIN D2) 50,000 units    cyanocobalamin 1,000 mcg/mL    SYRINGE-NEEDLE, DISP, 3 ML 25G X 1" 3 ML MISC    Other Relevant Orders    Hepatitis B core antibody, total    Hepatitis B surface antibody    Hepatitis B surface antigen    HIV 1/2 AG/AB w Reflex [facility] for 2 yr old and above    IgG, IgA, IgM    CBC and differential    Comprehensive metabolic panel       Other    Tremor    Relevant Medications    cyanocobalamin 1,000 mcg/mL    SYRINGE-NEEDLE, DISP, 3 ML 25G X 1" 3 ML MISC    Right arm weakness    Relevant Medications    Dalfampridine ER 10 MG TB12    ergocalciferol (VITAMIN D2) 50,000 units    Chronic fatigue    Relevant Medications    cyanocobalamin 1,000 mcg/mL    SYRINGE-NEEDLE, DISP, 3 ML 25G X 1" 3 ML MISC            Reason for visit is   Chief Complaint   Patient presents with    Virtual Regular Visit          Encounter provider Gagandeep Ballard MD    Provider located at [address] [place] [address],[address]      Recent Visits  No visits were found meeting these conditions. Showing recent visits within past 7 days and meeting all other requirements  Today's Visits  Date Type Provider Dept   11/29/23 Telephone [facility]   11/29/23 Telemedicine Gagandeep Ballard MD [facility]   Showing today's visits and meeting all other requirements  Future Appointments  No visits were found meeting these conditions.   Showing future appointments within next 150 days and meeting all other requirements       The patient was identified by name and date of birth. Berwyn Nyhan was informed that this is a telemedicine visit and that the visit is being conducted through the [employer]. She agrees to proceed. .  My office door was closed. No one else was in the room. She acknowledged consent and understanding of privacy and security of the video platform. The patient has agreed to participate and understands they can discontinue the visit at any time. Patient is aware this is a billable service. Subjective  Berwyn Nyhan is a 48 y.o. female with MS and related issues. HPI     Mrs. Asia Chan has presented to [facility] multiple sclerosis center for follow-up on multiple sclerosis and related issues. Today's concerns are: Balance dysfunction, ongoing bowel incontinence, weakness of right lower extremity, worsening tremors with stressful circumstances or feeling exhausted. Patient described having exertional developed after 2 days at work, which limits her activities of daily living. Patient completed brain MRI and she is here today to discuss her findings. Patient has no evidence of demyelination in cervical spine; patient has single demyelination  plaques at the T12 and thoracic region which was noted in October 2022 without enhancement. Patient transitioned from  Aubagio to teriflunomide. Patient also requires evaluation by rheumatology endocrinology for overactive thyroid. Patient has elevated thyroglobulin. Patient has serologic evaluation as well as primary care evaluation for diarrhea. Patient requested letter from  team as she would like to start working on her insurance and transitioning to part-time. Patient completed imaging of the thoracic and cervical spine in October 2022 with brain imaging completed in September 2022, imaging results were previously discussed.     MRI brain 8/2023: Redemonstrated supratentorial T2/FLAIR signal abnormalities, some of which are new and are suggestive of demyelinating plaques. Interval resolution of previously noted enhancement. No new enhancing lesions. Beryle Olmedo     Mrs. Godwin Hughes has presented to [facility] multiple sclerosis center for follow-up on multiple sclerosis and related issues. Patient describes no new sensorimotor dysfunction with residual bowel incontinence at least twice, as per the patient. Patient continued describing balance dysfunction and right lower extremity weakness but tremors have significantly improved with less stress been reported in her environment. Patient has been taking teriflunomide 14 mg daily. Patient completed imaging of the brain in August 2023 where patient was found to have new demyelination in the right frontal area which suggest disease progression while taking teriflunomide. Considering patient age, recent infection and stressful environment, patient was offered to consider transition to ofatumumab. 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 .    Patient has single demyelinating plaque at the T12 on her MRIs completed last year. Patient will be advised to establish care with urology. Patient described fatigue which has been chronic and interferes with patient daily activities. Patient will be resuming vitamin B12 1000 mcg intramuscular injection starting once a week for 4 weeks then continue once a month for 5 months. Vitamin D2 50,000 international units was sent for 3 months. We also agreed patient would benefit from trial of dalfampridine 10 mg twice daily to help with walking capacity and energy in general.    Patient is to follow [facility] neurology within 2 months. History reviewed. No pertinent past medical history. History reviewed.  No pertinent surgical history. Current Outpatient Medications   Medication Sig Dispense Refill    cholecalciferol (VITAMIN D3) 1,000 units tablet Take 1,000 Units by mouth daily Take 1tabs (1000mg) PO daily      Cyanocobalamin (Vitamin B 12) 500 MCG TABS Take 1,000 mcg by mouth Take 2 tabs (1000mg) PO daily      cyanocobalamin 1,000 mcg/mL Take B12 1000 mcg IM once a week for 4 weeks, then once a month for 5 months 9 mL 0    Dalfampridine ER 10 MG TB12 Take 1 tablet (10 mg total) by mouth 2 (two) times a day 60 tablet 5    ergocalciferol (VITAMIN D2) 50,000 units Take 1 capsule (50,000 Units total) by mouth once a week 12 capsule 0    FLUoxetine (PROzac) 10 mg capsule take 1 capsule by mouth once daily 30 capsule 1    fluticasone (FLONASE) 50 mcg/act nasal spray 2 sprays into each nostril daily (Patient taking differently: 2 sprays into each nostril daily PRN) 11.1 mL 0    ibuprofen (MOTRIN) 800 mg tablet Take 1 tablet (800 mg total) by mouth every 8 (eight) hours as needed for moderate pain 90 tablet 6    Loratadine 10 MG CAPS Take by mouth as needed      SYRINGE-NEEDLE, DISP, 3 ML 25G X 1" 3 ML MISC Inject into a muscle every 28 days 9 each 0    Teriflunomide 14 MG TABS Take 1 tablet (14 mg total) by mouth in the morning 90 tablet 3    famotidine (PEPCID) 40 MG tablet take 1 tablet by mouth once daily (Patient not taking: Reported on 11/29/2023) 20 tablet 2     No current facility-administered medications for this visit. Allergies   Allergen Reactions    Sulfa Antibiotics Other (See Comments)     Family history ngo jermaine syndrone    Amoxicillin-Pot Clavulanate Diarrhea    Molds & Smuts Fatigue       Review of Systems   Constitutional:  Negative for appetite change, fatigue and fever. HENT: Negative. Negative for hearing loss, tinnitus, trouble swallowing and voice change. Eyes: Negative. Negative for photophobia, pain and visual disturbance. Respiratory: Negative. Negative for shortness of breath. Cardiovascular: Negative. Negative for palpitations. Gastrointestinal: Negative. Negative for nausea and vomiting. Endocrine: Negative. Negative for cold intolerance. Genitourinary: Negative. Negative for dysuria, frequency and urgency. Musculoskeletal:  Negative for back pain, gait problem, myalgias and neck pain. Skin: Negative. Negative for rash. Allergic/Immunologic: Negative. Neurological: Negative. Negative for dizziness, tremors, seizures, syncope, facial asymmetry, speech difficulty, weakness, light-headedness, numbness and headaches. Hematological: Negative. Does not bruise/bleed easily. Psychiatric/Behavioral: Negative. Negative for confusion, hallucinations and sleep disturbance. Video Exam    There were no vitals filed for this visit.     Physical Exam     Visit Time  Total Visit Duration: 30 min

## 2023-11-29 NOTE — PROGRESS NOTES
Virtual Regular Visit    Verification of patient location:    Patient is located at {[facility] Virtual Patient Location:[address]} in the following state in which I hold an active license {[facility] virtual patient location:[address]}      Assessment/Plan:    Problem List Items Addressed This Visit    None           Reason for visit is   Chief Complaint   Patient presents with   • Virtual Regular Visit          Encounter provider Gisella Caballero MD    Provider located at [address]      Recent Visits  No visits were found meeting these conditions. Showing recent visits within past 7 days and meeting all other requirements  Today's Visits  Date Type Provider Dept   11/29/23 Telemedicine Gisella Caballero MD [facility]   Showing today's visits and meeting all other requirements  Future Appointments  No visits were found meeting these conditions. Showing future appointments within next 150 days and meeting all other requirements       The patient was identified by name and date of birth. Jamir Pack was informed that this is a telemedicine visit and that the visit is being conducted through {[facility] VIRTUAL VISIT VHBERO:13158}. {Telemedicine confidentiality :63225} {Telemedicine participants:91014}  She acknowledged consent and understanding of privacy and security of the video platform. The patient has agreed to participate and understands they can discontinue the visit at any time. Patient is aware this is a billable service. Subjective  Jamir Pack is a 48 y.o. female *** . HPI     History reviewed. No pertinent past medical history. History reviewed. No pertinent surgical history.     Current Outpatient Medications   Medication Sig Dispense Refill   • cholecalciferol (VITAMIN D3) 1,000 units tablet Take 1,000 Units by mouth daily Take 1tabs (1000mg) PO daily     • Cyanocobalamin (Vitamin B 12) 500 MCG TABS Take 1,000 mcg by mouth Take 2 tabs (1000mg) PO daily     • FLUoxetine (PROzac) 10 mg capsule take 1 capsule by mouth once daily 30 capsule 1   • fluticasone (FLONASE) 50 mcg/act nasal spray 2 sprays into each nostril daily (Patient taking differently: 2 sprays into each nostril daily PRN) 11.1 mL 0   • ibuprofen (MOTRIN) 800 mg tablet Take 1 tablet (800 mg total) by mouth every 8 (eight) hours as needed for moderate pain 90 tablet 6   • Loratadine 10 MG CAPS Take by mouth as needed     • Teriflunomide 14 MG TABS Take 1 tablet (14 mg total) by mouth in the morning 90 tablet 3   • famotidine (PEPCID) 40 MG tablet take 1 tablet by mouth once daily (Patient not taking: Reported on 11/29/2023) 20 tablet 2     No current facility-administered medications for this visit. Allergies   Allergen Reactions   • Sulfa Antibiotics Other (See Comments)     Family history ngo jermaine syndrone   • Amoxicillin-Pot Clavulanate Diarrhea   • Molds & Smuts Fatigue       Review of Systems    Video Exam    There were no vitals filed for this visit.     Physical Exam     Visit Time  Total Visit Duration: ***

## 2023-11-29 NOTE — TELEPHONE ENCOUNTER
Called patient. No answer. Left a detailed message re: scheduling patient for a 2 month f/u visit. Along with our callback number. Mailed blood work to patient.

## 2023-12-04 DIAGNOSIS — R53.82 CHRONIC FATIGUE: ICD-10-CM

## 2023-12-04 DIAGNOSIS — R25.1 TREMOR: ICD-10-CM

## 2023-12-04 DIAGNOSIS — G35 MS (MULTIPLE SCLEROSIS) (HCC): ICD-10-CM

## 2023-12-04 DIAGNOSIS — R29.898 RIGHT ARM WEAKNESS: ICD-10-CM

## 2023-12-04 RX ORDER — CYANOCOBALAMIN 1000 UG/ML
INJECTION, SOLUTION INTRAMUSCULAR; SUBCUTANEOUS
Qty: 9 ML | Refills: 0 | Status: SHIPPED | OUTPATIENT
Start: 2023-12-04

## 2023-12-04 RX ORDER — ERGOCALCIFEROL 1.25 MG/1
50000 CAPSULE ORAL WEEKLY
Qty: 12 CAPSULE | Refills: 0 | Status: SHIPPED | OUTPATIENT
Start: 2023-12-04

## 2023-12-04 RX ORDER — DALFAMPRIDINE 10 MG/1
10 TABLET, FILM COATED, EXTENDED RELEASE ORAL 2 TIMES DAILY
Qty: 60 TABLET | Refills: 5 | Status: SHIPPED | OUTPATIENT
Start: 2023-12-04

## 2023-12-04 NOTE — TELEPHONE ENCOUNTER
received vm from 12/4 at 8:56am-Hi, this is Gordon Lang, date of birth, [birth year] I'm a patient of Dr. Mandeep Serna. I had an appointment with her last Wednesday and New medicines were ordered. However, I need them to be ordered to [facility] in [address] [place] [address], not the [facility]. They don't have them and I can get them quicker through my local pharmacy. If there's any questions, please give me a call back. Thank you.  Tosha.  [phone]  ----------------------------------------------------------  Scripts entered, please sign off

## 2023-12-05 NOTE — TELEPHONE ENCOUNTER
Fax received from pharmacy stating PA required. Key: [id number]     PA submitted via [facility]. Awaiting determination.

## 2023-12-15 ENCOUNTER — TELEPHONE (OUTPATIENT)
Dept: RHEUMATOLOGY | Facility: CLINIC | Age: 53
End: 2023-12-15

## 2023-12-20 ENCOUNTER — TELEPHONE (OUTPATIENT)
Dept: RHEUMATOLOGY | Facility: CLINIC | Age: 53
End: 2023-12-20

## 2023-12-24 DIAGNOSIS — G37.9 CNS DEMYELINATION (HCC): ICD-10-CM

## 2023-12-24 DIAGNOSIS — R29.898 RIGHT ARM WEAKNESS: ICD-10-CM

## 2023-12-24 DIAGNOSIS — F43.22 ADJUSTMENT DISORDER WITH ANXIOUS MOOD: ICD-10-CM

## 2023-12-25 RX ORDER — FLUOXETINE 10 MG/1
CAPSULE ORAL
Qty: 30 CAPSULE | Refills: 1 | Status: SHIPPED | OUTPATIENT
Start: 2023-12-25

## 2024-01-24 ENCOUNTER — TELEMEDICINE (OUTPATIENT)
Dept: RHEUMATOLOGY | Facility: CLINIC | Age: 54
End: 2024-01-24
Payer: COMMERCIAL

## 2024-01-24 DIAGNOSIS — M19.90 OSTEOARTHRITIS, UNSPECIFIED OSTEOARTHRITIS TYPE, UNSPECIFIED SITE: Primary | ICD-10-CM

## 2024-01-24 DIAGNOSIS — M25.50 HYPERMOBILITY ARTHRALGIA: ICD-10-CM

## 2024-01-24 DIAGNOSIS — R76.8 RHEUMATOID FACTOR POSITIVE: ICD-10-CM

## 2024-01-24 PROCEDURE — 99214 OFFICE O/P EST MOD 30 MIN: CPT | Performed by: INTERNAL MEDICINE

## 2024-01-29 ENCOUNTER — APPOINTMENT (OUTPATIENT)
Dept: LAB | Facility: CLINIC | Age: 54
End: 2024-01-29
Payer: COMMERCIAL

## 2024-01-29 ENCOUNTER — OFFICE VISIT (OUTPATIENT)
Dept: NEUROLOGY | Facility: CLINIC | Age: 54
End: 2024-01-29
Payer: COMMERCIAL

## 2024-01-29 VITALS
SYSTOLIC BLOOD PRESSURE: 112 MMHG | DIASTOLIC BLOOD PRESSURE: 88 MMHG | HEIGHT: 65 IN | BODY MASS INDEX: 25.62 KG/M2 | TEMPERATURE: 97.8 F | WEIGHT: 153.8 LBS | HEART RATE: 113 BPM | OXYGEN SATURATION: 94 %

## 2024-01-29 DIAGNOSIS — R53.82 CHRONIC FATIGUE: ICD-10-CM

## 2024-01-29 DIAGNOSIS — G35 MS (MULTIPLE SCLEROSIS) (HCC): Primary | ICD-10-CM

## 2024-01-29 DIAGNOSIS — G35 MS (MULTIPLE SCLEROSIS) (HCC): ICD-10-CM

## 2024-01-29 PROBLEM — N31.9 NEUROGENIC BLADDER: Status: ACTIVE | Noted: 2024-01-29

## 2024-01-29 LAB
ALBUMIN SERPL BCP-MCNC: 4.5 G/DL (ref 3.5–5)
ALP SERPL-CCNC: 51 U/L (ref 34–104)
ALT SERPL W P-5'-P-CCNC: 24 U/L (ref 7–52)
ANION GAP SERPL CALCULATED.3IONS-SCNC: 9 MMOL/L
AST SERPL W P-5'-P-CCNC: 17 U/L (ref 13–39)
BILIRUB SERPL-MCNC: 0.35 MG/DL (ref 0.2–1)
BUN SERPL-MCNC: 13 MG/DL (ref 5–25)
CALCIUM SERPL-MCNC: 9.6 MG/DL (ref 8.4–10.2)
CHLORIDE SERPL-SCNC: 103 MMOL/L (ref 96–108)
CO2 SERPL-SCNC: 27 MMOL/L (ref 21–32)
CREAT SERPL-MCNC: 0.67 MG/DL (ref 0.6–1.3)
GFR SERPL CREATININE-BSD FRML MDRD: 100 ML/MIN/1.73SQ M
GLUCOSE P FAST SERPL-MCNC: 102 MG/DL (ref 65–99)
HBV CORE AB SER QL: NORMAL
HBV SURFACE AB SER-ACNC: 8.01 MIU/ML
HBV SURFACE AG SER QL: NORMAL
HIV 1+2 AB+HIV1 P24 AG SERPL QL IA: NORMAL
HIV 2 AB SERPL QL IA: NORMAL
HIV1 AB SERPL QL IA: NORMAL
HIV1 P24 AG SERPL QL IA: NORMAL
IGA SERPL-MCNC: 188 MG/DL (ref 66–433)
IGG SERPL-MCNC: 841 MG/DL (ref 635–1741)
IGM SERPL-MCNC: 138 MG/DL (ref 45–281)
POTASSIUM SERPL-SCNC: 3.9 MMOL/L (ref 3.5–5.3)
PROT SERPL-MCNC: 7.1 G/DL (ref 6.4–8.4)
SODIUM SERPL-SCNC: 139 MMOL/L (ref 135–147)

## 2024-01-29 PROCEDURE — 99215 OFFICE O/P EST HI 40 MIN: CPT | Performed by: PSYCHIATRY & NEUROLOGY

## 2024-01-29 PROCEDURE — 36415 COLL VENOUS BLD VENIPUNCTURE: CPT

## 2024-01-29 PROCEDURE — 82784 ASSAY IGA/IGD/IGG/IGM EACH: CPT

## 2024-01-29 PROCEDURE — 80053 COMPREHEN METABOLIC PANEL: CPT

## 2024-01-29 PROCEDURE — 87389 HIV-1 AG W/HIV-1&-2 AB AG IA: CPT

## 2024-01-29 PROCEDURE — 86706 HEP B SURFACE ANTIBODY: CPT

## 2024-01-29 PROCEDURE — 87340 HEPATITIS B SURFACE AG IA: CPT

## 2024-01-29 PROCEDURE — 86704 HEP B CORE ANTIBODY TOTAL: CPT

## 2024-01-29 NOTE — PROGRESS NOTES
Patient ID: Chloé Dodson is a 53 y.o. female.    Assessment/Plan:           Problem List Items Addressed This Visit          Nervous and Auditory    MS (multiple sclerosis) (HCC) - Primary       Other    Chronic fatigue        Mrs. Dodson has presented to [facility] multiple sclerosis center for follow-up on multiple sclerosis and related questions.    Patient described no new but improved function since patient started vitamin B12 and vitamin D supplements.  Patient continues taking teriflunomide 14 mg on daily basis.  We personally reviewed brain MRI from September 2022 and August 2023, we agreed that patient may develop new MS demyelination prior to teriflunomide effectively started protecting from relapses and we agreed patient would benefit from continue her current regimen.    Patient completed her serological workup today with results been pending for hepatitis panel, HIV, immunoglobulin level.    Patient has great improvement in thyroid function with no concern for thyroid neoplasm been brought after her recent evaluation by rheumatology team and thyroid ultrasound was nonconcerning.    Patient ambulates without assistive devices with some improvement in her balance noted today.  Patient will be planning to start Pilates with her friend.    We also discussed importance recognizing perimenopausal symptoms and that influence on patient wellbeing specifically how hot flashes influence multiple sclerosis related heat intolerance.    Patient is to discontinue dalfampridine.  Patient is to continue B12 injection.    Patient is to follow with brain MRI in November - December 2024.  Brain MRI of the cervical and thoracic spine completed in October 2022 and will be repeated in 2025 or earlier if patient presents with new focal neurological deficits.    Patient is to continue following with [facility] neurology within 6 months.      Subjective: Multiple sclerosis and related questions    HPI  Mrs. Dodson has  presented to [facility] multiple sclerosis center for follow-up on multiple sclerosis and related issues.   Patient describes no new sensorimotor dysfunction while taking teriflunomide 14 mg daily.     Patient completed imaging of the brain in August 2023 where patient was found to have new demyelination in the right frontal area. Patient has single demyelinating plaque at the T12 on her MRIs completed last year.    Patient has been working with ophthalmology for vitreous detachment as patient described floaters.    Patient completed ultrasound of the thyroid with normal findings reported, serologic workup was done with normal thyroglobulin been advised.    Patient describes no major issues related to multiple sclerosis.  No bladder or bowel dysfunction.  Patient does not believe she has major worsening tiredness.  Patient believes current improvement is related to B12 treatment in addition to weather changes to cold season and increase vitamin D level to 50,000 weekly doses.  Patient has great quality of sleep.  Patient believes she likely in  perimenopausal period as she is at times waking up hot.    Patient has been off dalfampridine    Patient might be feeling tired when she is shopping in the third store.  At times patient feels like she is missing step here there.  Patient is not having any recent falls.    Patient has no significant hair loss due to teriflunomide;    The following portions of the patient's history were reviewed and updated as appropriate: She  has no past medical history on file.  She   Patient Active Problem List    Diagnosis Date Noted    Neurogenic bladder 01/29/2024    Chronic fatigue 11/29/2023    Numbness of right foot 11/11/2022    MS (multiple sclerosis) (HCC) 11/11/2022    Tremor 10/07/2022    Right arm weakness 10/07/2022     She  has no past surgical history on file.  Her family history is not on file.  She  reports that she has been smoking cigarettes. She has a 8.0 pack-year  "smoking history. She has never used smokeless tobacco. She reports current alcohol use. She reports that she does not use drugs.  Current Outpatient Medications   Medication Sig Dispense Refill    cyanocobalamin 1,000 mcg/mL Take B12 1000 mcg IM once a week for 4 weeks, then once a month for 5 months 9 mL 0    ergocalciferol (VITAMIN D2) 50,000 units Take 1 capsule (50,000 Units total) by mouth once a week 12 capsule 0    FLUoxetine (PROzac) 10 mg capsule take 1 capsule by mouth once daily 30 capsule 1    fluticasone (FLONASE) 50 mcg/act nasal spray 2 sprays into each nostril daily (Patient taking differently: 2 sprays into each nostril daily PRN) 11.1 mL 0    ibuprofen (MOTRIN) 800 mg tablet Take 1 tablet (800 mg total) by mouth every 8 (eight) hours as needed for moderate pain 90 tablet 6    Loratadine 10 MG CAPS Take by mouth as needed      SYRINGE-NEEDLE, DISP, 3 ML 25G X 1\" 3 ML MISC Inject into a muscle every 28 days 9 each 0    Teriflunomide 14 MG TABS Take 1 tablet (14 mg total) by mouth in the morning 90 tablet 3    cholecalciferol (VITAMIN D3) 1,000 units tablet Take 1,000 Units by mouth daily Take 1tabs (1000mg) PO daily (Patient not taking: Reported on 1/29/2024)       No current facility-administered medications for this visit.     Current Outpatient Medications on File Prior to Visit   Medication Sig    cyanocobalamin 1,000 mcg/mL Take B12 1000 mcg IM once a week for 4 weeks, then once a month for 5 months    ergocalciferol (VITAMIN D2) 50,000 units Take 1 capsule (50,000 Units total) by mouth once a week    FLUoxetine (PROzac) 10 mg capsule take 1 capsule by mouth once daily    fluticasone (FLONASE) 50 mcg/act nasal spray 2 sprays into each nostril daily (Patient taking differently: 2 sprays into each nostril daily PRN)    ibuprofen (MOTRIN) 800 mg tablet Take 1 tablet (800 mg total) by mouth every 8 (eight) hours as needed for moderate pain    Loratadine 10 MG CAPS Take by mouth as needed    " "SYRINGE-NEEDLE, DISP, 3 ML 25G X 1\" 3 ML MISC Inject into a muscle every 28 days    Teriflunomide 14 MG TABS Take 1 tablet (14 mg total) by mouth in the morning    cholecalciferol (VITAMIN D3) 1,000 units tablet Take 1,000 Units by mouth daily Take 1tabs (1000mg) PO daily (Patient not taking: Reported on 1/29/2024)    [DISCONTINUED] Cyanocobalamin (Vitamin B 12) 500 MCG TABS Take 1,000 mcg by mouth Take 2 tabs (1000mg) PO daily (Patient not taking: Reported on 1/29/2024)    [DISCONTINUED] Dalfampridine ER 10 MG TB12 Take 1 tablet (10 mg total) by mouth 2 (two) times a day (Patient not taking: Reported on 1/29/2024)    [DISCONTINUED] famotidine (PEPCID) 40 MG tablet take 1 tablet by mouth once daily (Patient not taking: Reported on 11/29/2023)     No current facility-administered medications on file prior to visit.     She is allergic to sulfa antibiotics, amoxicillin-pot clavulanate, and molds & smuts..         Objective:    Blood pressure 112/88, pulse (!) 113, temperature 97.8 °F (36.6 °C), temperature source Temporal, height 5' 5\" (1.651 m), weight 69.8 kg (153 lb 12.8 oz), SpO2 94%.    Physical Exam    Neurological Exam  CONSTITUTIONAL: NAD, pleasant. NECK: supple, no lymphadenopathy, no thyromegaly, no JVD. CARDIOVASCULAR: RRR, normal S1S2, no murmurs, no rubs. RESP: clear to auscultation bilaterally, no wheezes/rhonchi/rales. ABDOMEN: soft, non tender, non distended. SKIN: no rash or skin lesions. EXTREMITIES: no edema, pulses 2+bilaterally. PSYCH: appropriate mood and affect  NEUROLOGIC COMPREHENSIVE EXAM: Patient is oriented to person, place and time, NAD; appropriate affect. CN II, III, IV, V, VI, VII,VIII,IX,X,XI-XII intact with EOMI, PERRLA, OKN intact, VF grossly intact, fundi poorly visualized secondary to pupillary constriction; symmetric face noted. Motor: 5/5 UE/LE bilateral symmetric; Sensory: intact to light touch and pinprick bilaterally; normal vibration sensation feet bilaterally; Coordination " within normal limits on FTN and DAKOTA testing; DTR: 2/4 through, no Babinski, no clonus. Tandem gait is abnormal. Romberg: absent.    ROS:    Review of Systems   Constitutional:  Negative for appetite change, fatigue and fever.   HENT: Negative.  Negative for hearing loss, tinnitus, trouble swallowing and voice change.    Eyes: Negative.  Negative for photophobia, pain and visual disturbance.   Respiratory: Negative.  Negative for shortness of breath.    Cardiovascular: Negative.  Negative for palpitations.   Gastrointestinal: Negative.  Negative for nausea and vomiting.   Endocrine: Negative.  Negative for cold intolerance.   Genitourinary: Negative.  Negative for dysuria, frequency and urgency.   Musculoskeletal:  Positive for gait problem (T25FW 6.31 seconds). Negative for back pain, myalgias, neck pain and neck stiffness.   Skin: Negative.  Negative for rash.   Allergic/Immunologic: Negative.    Neurological:  Negative for dizziness, tremors, seizures, syncope, facial asymmetry, speech difficulty, weakness, light-headedness, numbness and headaches.   Hematological: Negative.  Does not bruise/bleed easily.   Psychiatric/Behavioral: Negative.  Negative for confusion, hallucinations and sleep disturbance.

## 2024-01-31 NOTE — PROGRESS NOTES
Virtual Regular Visit    Verification of patient location:    Patient is located at Home in the following state in which I hold an active license [place]      Assessment/Plan:  53 y.o. female present for follow-up of possible inflammatory arthritis, which has been ruled out with lab and x-ray workup.  Patient has history of hypermobility, and likely has a component of osteoarthritis related to hypermobility arthralgia.  Currently doing well.  Takes ibuprofen 400 mg as needed, which helps. Does not need further intervention at this time.    Problem List Items Addressed This Visit    None  Visit Diagnoses       Osteoarthritis, unspecified osteoarthritis type, unspecified site    -  Primary    Hypermobility arthralgia        Rheumatoid factor positive              Return to clinic as needed        Reason for visit is follow-up of possible inflammatory arthritis  Chief Complaint   Patient presents with    Virtual Regular Visit          Encounter provider Roxanne Andino MD    Provider located at [facility] [address] [address]  [phone]      Recent Visits  Date Type Provider Dept   01/24/24 Telemedicine Roxanne Andino MD  Rheumatology [facility]   Showing recent visits within past 7 days and meeting all other requirements  Future Appointments  No visits were found meeting these conditions.  Showing future appointments within next 150 days and meeting all other requirements       The patient was identified by name and date of birth. Chloé HEATHER Dodson was informed that this is a telemedicine visit and that the visit is being conducted through the Epic Embedded platform. She agrees to proceed..  My office door was closed. No one else was in the room.  She acknowledged consent and understanding of privacy and security of the video platform. The patient has agreed to participate and understands they can discontinue the visit  at any time.    Patient is aware this is a billable service.       Rheumatic disease summary  Last/initial visit 7/24/23: Patient presents for evaluation of overall fatigue, hand pain, hip pain.  Prior evaluation by rheumatology in 2012, positive KLEBER 1:40 at the time.  Rheumatoid factor positive per lab work 10/7/2022 - slightly positive at 10.  Patient admtis to dry eyes and has right third trigger finger.  Also admits to being extra flexible; could have hypermobility arthralgia.  Mother has connective tissue disease related ILD and was on steroids.    Continue ibuprofen up to 800mg 3 times a day as needed for joint pain, take with food  Do labs; returned unremarkable  Do hand and low back x-rays; returned unremarkable    Subjective/HPI  Chloé Dodson is a 53 y.o. female presents for follow-up of possible inflammatory arthritis, which has been ruled out. Patient was not well in July and August, and took off work in September. She was possibly in a MS flare. Has been fine October onwards. Does not have any more trigger finger; injection helped. Feels well currently.      History reviewed. No pertinent past medical history.    History reviewed. No pertinent surgical history.    Current Outpatient Medications   Medication Sig Dispense Refill    cholecalciferol (VITAMIN D3) 1,000 units tablet Take 1,000 Units by mouth daily Take 1tabs (1000mg) PO daily (Patient not taking: Reported on 1/29/2024)      cyanocobalamin 1,000 mcg/mL Take B12 1000 mcg IM once a week for 4 weeks, then once a month for 5 months 9 mL 0    ergocalciferol (VITAMIN D2) 50,000 units Take 1 capsule (50,000 Units total) by mouth once a week 12 capsule 0    FLUoxetine (PROzac) 10 mg capsule take 1 capsule by mouth once daily 30 capsule 1    fluticasone (FLONASE) 50 mcg/act nasal spray 2 sprays into each nostril daily (Patient taking differently: 2 sprays into each nostril daily PRN) 11.1 mL 0    ibuprofen (MOTRIN) 800 mg tablet Take 1 tablet (800  "mg total) by mouth every 8 (eight) hours as needed for moderate pain 90 tablet 6    Loratadine 10 MG CAPS Take by mouth as needed      SYRINGE-NEEDLE, DISP, 3 ML 25G X 1\" 3 ML MISC Inject into a muscle every 28 days 9 each 0    Teriflunomide 14 MG TABS Take 1 tablet (14 mg total) by mouth in the morning 90 tablet 3     No current facility-administered medications for this visit.        Allergies   Allergen Reactions    Sulfa Antibiotics Other (See Comments)     Family history gno jermaine syndrone    Amoxicillin-Pot Clavulanate Diarrhea    Molds & Smuts Fatigue       Review of Systems   Musculoskeletal:  Negative for arthralgias and joint swelling.   Skin:  Negative for rash.       Video Exam    There were no vitals filed for this visit.    Physical Exam  Constitutional:       General: She is not in acute distress.  HENT:      Head: Normocephalic and atraumatic.   Eyes:      Conjunctiva/sclera: Conjunctivae normal.   Pulmonary:      Effort: Pulmonary effort is normal. No respiratory distress.   Musculoskeletal:      Cervical back: Neck supple.   Skin:     Coloration: Skin is not pale.   Neurological:      Mental Status: She is alert. Mental status is at baseline.   Psychiatric:         Mood and Affect: Mood normal.         Behavior: Behavior normal.          Visit Time  Total Visit Duration: 21 minutes        "

## 2024-02-19 DIAGNOSIS — G35 MS (MULTIPLE SCLEROSIS) (HCC): ICD-10-CM

## 2024-02-19 DIAGNOSIS — R29.898 RIGHT ARM WEAKNESS: ICD-10-CM

## 2024-02-19 RX ORDER — ERGOCALCIFEROL 1.25 MG/1
50000 CAPSULE ORAL WEEKLY
Qty: 12 CAPSULE | Refills: 0 | Status: SHIPPED | OUTPATIENT
Start: 2024-02-19

## 2024-02-20 DIAGNOSIS — R29.898 RIGHT ARM WEAKNESS: ICD-10-CM

## 2024-02-20 DIAGNOSIS — R25.1 TREMOR: ICD-10-CM

## 2024-02-20 DIAGNOSIS — R53.82 CHRONIC FATIGUE: ICD-10-CM

## 2024-02-20 DIAGNOSIS — G37.9 CNS DEMYELINATION (HCC): ICD-10-CM

## 2024-02-20 DIAGNOSIS — F43.22 ADJUSTMENT DISORDER WITH ANXIOUS MOOD: ICD-10-CM

## 2024-02-20 DIAGNOSIS — G35 MS (MULTIPLE SCLEROSIS) (HCC): ICD-10-CM

## 2024-02-20 RX ORDER — FLUOXETINE 10 MG/1
CAPSULE ORAL
Qty: 30 CAPSULE | Refills: 1 | Status: SHIPPED | OUTPATIENT
Start: 2024-02-20

## 2024-02-20 RX ORDER — CYANOCOBALAMIN 1000 UG/ML
INJECTION, SOLUTION INTRAMUSCULAR; SUBCUTANEOUS
Qty: 9 ML | Refills: 0 | Status: SHIPPED | OUTPATIENT
Start: 2024-02-20

## 2024-04-13 DIAGNOSIS — G37.9 CNS DEMYELINATION (HCC): ICD-10-CM

## 2024-04-13 DIAGNOSIS — R29.898 RIGHT ARM WEAKNESS: ICD-10-CM

## 2024-04-13 DIAGNOSIS — F43.22 ADJUSTMENT DISORDER WITH ANXIOUS MOOD: ICD-10-CM

## 2024-04-14 RX ORDER — FLUOXETINE 10 MG/1
CAPSULE ORAL
Qty: 30 CAPSULE | Refills: 1 | Status: SHIPPED | OUTPATIENT
Start: 2024-04-14

## 2024-06-14 DIAGNOSIS — G37.9 CNS DEMYELINATION (HCC): ICD-10-CM

## 2024-06-14 DIAGNOSIS — F43.22 ADJUSTMENT DISORDER WITH ANXIOUS MOOD: ICD-10-CM

## 2024-06-14 DIAGNOSIS — R29.898 RIGHT ARM WEAKNESS: ICD-10-CM

## 2024-06-14 RX ORDER — FLUOXETINE 10 MG/1
CAPSULE ORAL
Qty: 30 CAPSULE | Refills: 1 | Status: SHIPPED | OUTPATIENT
Start: 2024-06-14

## 2024-07-18 DIAGNOSIS — G35 MS (MULTIPLE SCLEROSIS) (HCC): ICD-10-CM

## 2024-07-18 DIAGNOSIS — R76.8 RHEUMATOID FACTOR POSITIVE: Primary | ICD-10-CM

## 2024-07-18 DIAGNOSIS — G37.9 CNS DEMYELINATION (HCC): ICD-10-CM

## 2024-07-23 ENCOUNTER — APPOINTMENT (OUTPATIENT)
Dept: LAB | Facility: CLINIC | Age: 54
End: 2024-07-23
Payer: COMMERCIAL

## 2024-07-23 DIAGNOSIS — G35 MS (MULTIPLE SCLEROSIS) (HCC): ICD-10-CM

## 2024-07-23 LAB
BASOPHILS # BLD AUTO: 0.16 THOUSANDS/ÂΜL (ref 0–0.1)
BASOPHILS NFR BLD AUTO: 2 % (ref 0–1)
BUN SERPL-MCNC: 9 MG/DL (ref 5–25)
CREAT SERPL-MCNC: 0.62 MG/DL (ref 0.6–1.3)
EOSINOPHIL # BLD AUTO: 0.87 THOUSAND/ÂΜL (ref 0–0.61)
EOSINOPHIL NFR BLD AUTO: 10 % (ref 0–6)
ERYTHROCYTE [DISTWIDTH] IN BLOOD BY AUTOMATED COUNT: 13.6 % (ref 11.6–15.1)
GFR SERPL CREATININE-BSD FRML MDRD: 103 ML/MIN/1.73SQ M
HCT VFR BLD AUTO: 44.9 % (ref 34.8–46.1)
HGB BLD-MCNC: 14.7 G/DL (ref 11.5–15.4)
IMM GRANULOCYTES # BLD AUTO: 0.03 THOUSAND/UL (ref 0–0.2)
IMM GRANULOCYTES NFR BLD AUTO: 0 % (ref 0–2)
LYMPHOCYTES # BLD AUTO: 2.4 THOUSANDS/ÂΜL (ref 0.6–4.47)
LYMPHOCYTES NFR BLD AUTO: 28 % (ref 14–44)
MCH RBC QN AUTO: 30.1 PG (ref 26.8–34.3)
MCHC RBC AUTO-ENTMCNC: 32.7 G/DL (ref 31.4–37.4)
MCV RBC AUTO: 92 FL (ref 82–98)
MONOCYTES # BLD AUTO: 0.64 THOUSAND/ÂΜL (ref 0.17–1.22)
MONOCYTES NFR BLD AUTO: 8 % (ref 4–12)
NEUTROPHILS # BLD AUTO: 4.35 THOUSANDS/ÂΜL (ref 1.85–7.62)
NEUTS SEG NFR BLD AUTO: 52 % (ref 43–75)
NRBC BLD AUTO-RTO: 0 /100 WBCS
PLATELET # BLD AUTO: 275 THOUSANDS/UL (ref 149–390)
PMV BLD AUTO: 11.5 FL (ref 8.9–12.7)
RBC # BLD AUTO: 4.88 MILLION/UL (ref 3.81–5.12)
WBC # BLD AUTO: 8.45 THOUSAND/UL (ref 4.31–10.16)

## 2024-07-23 PROCEDURE — 82565 ASSAY OF CREATININE: CPT

## 2024-07-23 PROCEDURE — 36415 COLL VENOUS BLD VENIPUNCTURE: CPT

## 2024-07-23 PROCEDURE — 84520 ASSAY OF UREA NITROGEN: CPT

## 2024-07-23 PROCEDURE — 85025 COMPLETE CBC W/AUTO DIFF WBC: CPT

## 2024-07-25 ENCOUNTER — HOSPITAL ENCOUNTER (OUTPATIENT)
Dept: MRI IMAGING | Facility: HOSPITAL | Age: 54
Discharge: HOME | End: 2024-07-25
Attending: PSYCHIATRY & NEUROLOGY
Payer: COMMERCIAL

## 2024-07-25 DIAGNOSIS — G35 MS (MULTIPLE SCLEROSIS) (HCC): ICD-10-CM

## 2024-07-25 DIAGNOSIS — R76.8 RHEUMATOID FACTOR POSITIVE: ICD-10-CM

## 2024-07-25 PROCEDURE — 70553 MRI BRAIN STEM W/O & W/DYE: CPT

## 2024-07-25 PROCEDURE — A9585 GADOBUTROL INJECTION: HCPCS | Performed by: PSYCHIATRY & NEUROLOGY

## 2024-07-25 RX ORDER — GADOBUTROL 604.72 MG/ML
6 INJECTION INTRAVENOUS
Status: COMPLETED | OUTPATIENT
Start: 2024-07-25 | End: 2024-07-25

## 2024-07-25 RX ADMIN — GADOBUTROL 6 ML: 604.72 INJECTION INTRAVENOUS at 13:21

## 2024-07-29 ENCOUNTER — OFFICE VISIT (OUTPATIENT)
Dept: NEUROLOGY | Facility: CLINIC | Age: 54
End: 2024-07-29
Payer: COMMERCIAL

## 2024-07-29 ENCOUNTER — TELEPHONE (OUTPATIENT)
Dept: NEUROLOGY | Facility: CLINIC | Age: 54
End: 2024-07-29

## 2024-07-29 VITALS
BODY MASS INDEX: 26.02 KG/M2 | HEIGHT: 65 IN | SYSTOLIC BLOOD PRESSURE: 120 MMHG | OXYGEN SATURATION: 98 % | HEART RATE: 87 BPM | TEMPERATURE: 97.7 F | WEIGHT: 156.2 LBS | RESPIRATION RATE: 14 BRPM | DIASTOLIC BLOOD PRESSURE: 78 MMHG

## 2024-07-29 DIAGNOSIS — G37.9 CNS DEMYELINATION (HCC): ICD-10-CM

## 2024-07-29 DIAGNOSIS — N31.9 NEUROGENIC BLADDER: ICD-10-CM

## 2024-07-29 DIAGNOSIS — R20.0 NUMBNESS OF RIGHT FOOT: ICD-10-CM

## 2024-07-29 DIAGNOSIS — G35 MS (MULTIPLE SCLEROSIS) (HCC): Primary | ICD-10-CM

## 2024-07-29 DIAGNOSIS — R29.898 RIGHT ARM WEAKNESS: ICD-10-CM

## 2024-07-29 DIAGNOSIS — F43.22 ADJUSTMENT DISORDER WITH ANXIOUS MOOD: ICD-10-CM

## 2024-07-29 PROCEDURE — 99214 OFFICE O/P EST MOD 30 MIN: CPT | Performed by: PSYCHIATRY & NEUROLOGY

## 2024-07-29 RX ORDER — TERIFLUNOMIDE 14 MG/1
14 TABLET, FILM COATED ORAL DAILY
Qty: 90 TABLET | Refills: 3 | Status: SHIPPED | OUTPATIENT
Start: 2024-07-29

## 2024-07-29 RX ORDER — AMOXICILLIN 500 MG/1
500 TABLET, FILM COATED ORAL EVERY 8 HOURS
COMMUNITY
Start: 2024-06-13

## 2024-07-29 RX ORDER — FLUOXETINE 10 MG/1
10 CAPSULE ORAL DAILY
Qty: 30 CAPSULE | Refills: 5 | Status: SHIPPED | OUTPATIENT
Start: 2024-07-29

## 2024-07-29 NOTE — PROGRESS NOTES
Patient ID: Chloé Dodson is a 53 y.o. female.    Assessment/Plan:           Problem List Items Addressed This Visit          Nervous and Auditory    MS (multiple sclerosis) (HCC) - Primary    Relevant Medications    Teriflunomide 14 MG TABS       Urinary    Neurogenic bladder       Orthopedic/Musculoskeletal    Right arm weakness    Relevant Medications    FLUoxetine (PROzac) 10 mg capsule    Numbness of right foot     Other Visit Diagnoses       CNS demyelination (HCC)        Relevant Medications    FLUoxetine (PROzac) 10 mg capsule    Adjustment disorder with anxious mood        Relevant Medications    Teriflunomide 14 MG TABS    FLUoxetine (PROzac) 10 mg capsule             Mrs. Dodson has presented to [facility] multiple sclerosis center for follow-up on multiple sclerosis and related questions.  Patient has been taking teriflunomide 14 mg daily no side effects has been described with no liver dysfunction been noted on her serological workup done in, January 2024.  Patient also completed CBC with mild elevation of eosinophils and basophils, patient has itchiness on her skin likely contributing to dermatitis/eczema rather than multiple sclerosis related concerns.    Patient had severe relapse somewhere along the spring 2024 patient had back pain with right-sided pain and fatigue, dizziness, tremoring legs with right foot sensory dysfunction.  Patient intermittently describing right foot  pain in her ball of the foot, which improves when she is relaxing.  Significant improvement in her functional ambulation reported with regular manipulation done by chiropractor every 2 weeks.  Brain MRI completed on July 25, 2024 suggest stable chronic imaging disease within the cerebral hemisphere with mild plaque burden advised.  Imaging were compared to MRIs done in August 2023 with no disease progression noted.  Imaging from 2022 were compared to MRIs done in 2021.  Patient has several imaging plaque has resolved as  patient had periventricular regions suggestive of great repairing capacity.    Patient completed cervical MRI in October 2022 with no signs of demyelination appreciated in cervical and upper thoracic regions.  Thoracic spine MRI completed in 2022 suggestive of stable small T12 spinal cord demyelination.    Serologic workup for HIV/hepatitis panel/immunoglobulins were done in January 2024 and within normal range.    Patient received teriflunomide 14 mg refilled for the next 12 months, we agreed not to transition to other disease modifying regimen at this point considering patient age and immuno senescence.  Patient may benefit from adjusting her regimen to every other day for couple weeks but past 6 months from now.    Patient is to follow with [facility] neurology within 6 months.    Subjective: right eye twiching / neck and shoulder blade pain / numbness/headache wired pain in her head feels like pin in her brain     HPI    Mrs. Dodson has presented to [facility] multiple sclerosis center for follow-up on multiple sclerosis and related questions.     Patient described no new but improved function since patient started vitamin B12 and vitamin D supplements.  Patient continues taking teriflunomide 14 mg on daily basis.  Today patient describes sensory dysfunction between shoulder blades as well as upper neck and shoulders.  Patient has significant improvement in her function after she is works with chiropractor twice a month.  Significant improvement in right hand numbness been noted after his sessions.  Patient brought concern for potential MS relapse as she had developed pain in the back with pain involving the right side in addition to fatigue and dizziness with rambling legs and right foot sensorimotor dysfunction.  Intermittent right hip pain has been described.    We previously reviewed brain MRI from September 2022 and August 2023, we agreed that patient may develop new MS demyelination prior to teriflunomide  effectively started protecting from relapses and we agreed patient would benefit from continue her current regimen.     Patient has great improvement in thyroid function with no concern for thyroid neoplasm been brought after her recent evaluation by rheumatology team and thyroid ultrasound was nonconcerning.     Patient ambulates without assistive devices with some improvement in her balance noted today.  Patient will be planning to start Pilates with her friend.     We also discussed importance recognizing perimenopausal symptoms and that influence on patient wellbeing specifically how hot flashes influence multiple sclerosis related heat intolerance.     Patient had discontinued dalfampridine.  Patient is to continue B12 injection.            The following portions of the patient's history were reviewed and updated as appropriate: She  has no past medical history on file.  She   Patient Active Problem List    Diagnosis Date Noted    Neurogenic bladder 01/29/2024    Chronic fatigue 11/29/2023    Numbness of right foot 11/11/2022    MS (multiple sclerosis) ([facility]) 11/11/2022    Tremor 10/07/2022    Right arm weakness 10/07/2022     She  has no past surgical history on file.  Her family history is not on file.  She  reports that she has been smoking cigarettes. She has a 8 pack-year smoking history. She has never used smokeless tobacco. She reports current alcohol use of about 2.0 standard drinks of alcohol per week. She reports that she does not use drugs.  Current Outpatient Medications   Medication Sig Dispense Refill    FLUoxetine (PROzac) 10 mg capsule Take 1 capsule (10 mg total) by mouth daily 30 capsule 5    fluticasone (FLONASE) 50 mcg/act nasal spray 2 sprays into each nostril daily (Patient taking differently: 2 sprays into each nostril daily PRN) 11.1 mL 0    ibuprofen (MOTRIN) 800 mg tablet Take 1 tablet (800 mg total) by mouth every 8 (eight) hours as needed for moderate pain 90 tablet 6    Loratadine 10  "MG CAPS Take by mouth as needed      SYRINGE-NEEDLE, DISP, 3 ML 25G X 1\" 3 ML MISC Inject into a muscle every 28 days 9 each 0    Teriflunomide 14 MG TABS Take 1 tablet (14 mg total) by mouth in the morning 90 tablet 3    amoxicillin (AMOXIL) 500 MG tablet Take 500 mg by mouth every 8 (eight) hours (Patient not taking: Reported on 7/29/2024)      cholecalciferol (VITAMIN D3) 1,000 units tablet Take 1,000 Units by mouth daily Take 1tabs (1000mg) PO daily (Patient not taking: Reported on 1/29/2024)      cyanocobalamin 1,000 mcg/mL inject 1 MILLILITER intramuscularly every week for 4 weeks then Every Month for 5 MONTHS (Patient not taking: Reported on 7/29/2024) 9 mL 0    ergocalciferol (VITAMIN D2) 50,000 units take 1 capsule by mouth every week (Patient not taking: Reported on 7/29/2024) 12 capsule 0     No current facility-administered medications for this visit.     Current Outpatient Medications on File Prior to Visit   Medication Sig    fluticasone (FLONASE) 50 mcg/act nasal spray 2 sprays into each nostril daily (Patient taking differently: 2 sprays into each nostril daily PRN)    ibuprofen (MOTRIN) 800 mg tablet Take 1 tablet (800 mg total) by mouth every 8 (eight) hours as needed for moderate pain    Loratadine 10 MG CAPS Take by mouth as needed    SYRINGE-NEEDLE, DISP, 3 ML 25G X 1\" 3 ML MISC Inject into a muscle every 28 days    [DISCONTINUED] FLUoxetine (PROzac) 10 mg capsule take 1 capsule by mouth once daily    [DISCONTINUED] Teriflunomide 14 MG TABS Take 1 tablet (14 mg total) by mouth in the morning    amoxicillin (AMOXIL) 500 MG tablet Take 500 mg by mouth every 8 (eight) hours (Patient not taking: Reported on 7/29/2024)    cholecalciferol (VITAMIN D3) 1,000 units tablet Take 1,000 Units by mouth daily Take 1tabs (1000mg) PO daily (Patient not taking: Reported on 1/29/2024)    cyanocobalamin 1,000 mcg/mL inject 1 MILLILITER intramuscularly every week for 4 weeks then Every Month for 5 MONTHS (Patient " "not taking: Reported on 7/29/2024)    ergocalciferol (VITAMIN D2) 50,000 units take 1 capsule by mouth every week (Patient not taking: Reported on 7/29/2024)     No current facility-administered medications on file prior to visit.     She is allergic to sulfa antibiotics, amoxicillin-pot clavulanate, and molds & smuts..         Objective:    Blood pressure 120/78, pulse 87, temperature 97.7 °F (36.5 °C), temperature source Temporal, resp. rate 14, height 5' 5\" (1.651 m), weight 70.9 kg (156 lb 3.2 oz), SpO2 98%.    Physical Exam    Neurological Exam  CONSTITUTIONAL: NAD, pleasant. NECK: supple, no lymphadenopathy, no thyromegaly, no JVD. CARDIOVASCULAR: RRR, normal S1S2, no murmurs, no rubs. RESP: clear to auscultation bilaterally, no wheezes/rhonchi/rales. ABDOMEN: soft, non tender, non distended. SKIN: no rash or skin lesions. EXTREMITIES: no edema, pulses 2+bilaterally. PSYCH: appropriate mood and affect  NEUROLOGIC COMPREHENSIVE EXAM: Patient is oriented to person, place and time, NAD; appropriate affect. CN II, III, IV, V, VI, VII,VIII,IX,X,XI-XII intact with EOMI, PERRLA, OKN intact, VF grossly intact, fundi poorly visualized secondary to pupillary constriction; symmetric face noted. Motor: 5/5 UE/LE bilateral symmetric; Sensory: intact to light touch and pinprick bilaterally; normal vibration sensation feet bilaterally; Coordination within normal limits on FTN and DAKOTA testing; DTR: 2/4 through, no Babinski, no clonus. Tandem gait is intact. Romberg: absent.    ROS:    Review of Systems   Constitutional:  Negative for appetite change, fatigue and fever.   HENT: Negative.  Negative for hearing loss, tinnitus, trouble swallowing and voice change.    Eyes:  Positive for pain. Negative for photophobia and visual disturbance.   Respiratory: Negative.  Negative for shortness of breath.    Cardiovascular: Negative.  Negative for palpitations.   Gastrointestinal: Negative.  Negative for nausea and vomiting. "   Endocrine: Positive for heat intolerance. Negative for cold intolerance.   Genitourinary:  Positive for frequency and urgency. Negative for dysuria.   Musculoskeletal:  Positive for gait problem and neck pain. Negative for back pain, myalgias and neck stiffness.   Skin: Negative.  Negative for rash.   Allergic/Immunologic: Negative.    Neurological:  Positive for dizziness, tremors (internal  tremors), numbness and headaches (wired pain / feeling like pin on her head). Negative for seizures, syncope, facial asymmetry, speech difficulty, weakness and light-headedness.   Hematological: Negative.  Does not bruise/bleed easily.   Psychiatric/Behavioral:  Positive for sleep disturbance. Negative for confusion and hallucinations.

## 2024-07-29 NOTE — TELEPHONE ENCOUNTER
Teriflunomide refill was offered during this visit, plese follow-up with the patient if prioritization required

## 2024-08-07 DIAGNOSIS — R25.1 TREMOR: ICD-10-CM

## 2024-08-07 DIAGNOSIS — G35 MS (MULTIPLE SCLEROSIS) (HCC): ICD-10-CM

## 2024-08-07 DIAGNOSIS — R53.82 CHRONIC FATIGUE: ICD-10-CM

## 2024-08-07 RX ORDER — CYANOCOBALAMIN 1000 UG/ML
INJECTION, SOLUTION INTRAMUSCULAR; SUBCUTANEOUS
Qty: 9 ML | Refills: 0 | Status: SHIPPED | OUTPATIENT
Start: 2024-08-07

## 2024-08-07 NOTE — TELEPHONE ENCOUNTER
Per LOV 7/29/24 pt to continue taking her Vitamin B 12 injections.   Medication pended. Please sign if agreeable

## 2024-08-09 ENCOUNTER — APPOINTMENT (OUTPATIENT)
Dept: LAB | Facility: CLINIC | Age: 54
End: 2024-08-09
Payer: COMMERCIAL

## 2024-08-09 DIAGNOSIS — E04.1 THYROID NODULE: ICD-10-CM

## 2024-08-09 LAB
T3 SERPL-MCNC: 1.5 NG/ML
TSH SERPL DL<=0.05 MIU/L-ACNC: 0.48 UIU/ML (ref 0.45–4.5)

## 2024-08-09 PROCEDURE — 86376 MICROSOMAL ANTIBODY EACH: CPT

## 2024-08-09 PROCEDURE — 84480 ASSAY TRIIODOTHYRONINE (T3): CPT

## 2024-08-09 PROCEDURE — 36415 COLL VENOUS BLD VENIPUNCTURE: CPT

## 2024-08-09 PROCEDURE — 84443 ASSAY THYROID STIM HORMONE: CPT

## 2024-08-11 LAB — THYROPEROXIDASE AB SERPL-ACNC: 15 IU/ML (ref 0–34)

## 2024-08-19 DIAGNOSIS — F43.22 ADJUSTMENT DISORDER WITH ANXIOUS MOOD: ICD-10-CM

## 2024-08-19 DIAGNOSIS — R29.898 RIGHT ARM WEAKNESS: ICD-10-CM

## 2024-08-19 DIAGNOSIS — G37.9 CNS DEMYELINATION (HCC): ICD-10-CM

## 2024-08-20 RX ORDER — FLUOXETINE 10 MG/1
10 CAPSULE ORAL DAILY
Qty: 30 CAPSULE | Refills: 5 | Status: SHIPPED | OUTPATIENT
Start: 2024-08-20

## 2024-10-09 DIAGNOSIS — M19.90 OSTEOARTHRITIS, UNSPECIFIED OSTEOARTHRITIS TYPE, UNSPECIFIED SITE: ICD-10-CM

## 2024-10-09 RX ORDER — IBUPROFEN 800 MG/1
TABLET, FILM COATED ORAL
Qty: 90 TABLET | Refills: 2 | Status: SHIPPED | OUTPATIENT
Start: 2024-10-09

## 2024-12-16 ENCOUNTER — TELEPHONE (OUTPATIENT)
Age: 54
End: 2024-12-16

## 2024-12-16 NOTE — LETTER
Date: 12/16/2024    To whom it may concern:     This is to certify that Chloé Dodson has been under my care for the following diagnosis: Multiple Sclerosis.     I feel that Chloé Dodson is unable to serve on Jury Duty at this time for the above mentioned medical reasons.    Federal Jury Service [facility] [address]   Juror Number:[id number]    Please reach out to the Neurology office with any questions, [phone].         Sincerely,      Priya Mendes MD

## 2024-12-16 NOTE — TELEPHONE ENCOUNTER
Incoming call from patient requesting to be excused from Jury duty due to MS.  Patient of Dr. Mendes. Patient needs a physician's statement sent through the portal.      # [phone]    Dr. DOMINGUEZ,   Are you agreeable to excuse patient from jury duty ? Please advise. Thank you!

## 2024-12-16 NOTE — TELEPHONE ENCOUNTER
Outbound call placed to patient.    Asked pt to provide Juror number/physician statement.     Juror # [id number]    Federal Jury Service [facility] [address]   Fax# [phone]

## 2025-01-13 ENCOUNTER — TELEPHONE (OUTPATIENT)
Dept: NEUROLOGY | Facility: CLINIC | Age: 55
End: 2025-01-13

## 2025-01-13 NOTE — TELEPHONE ENCOUNTER
VIOLETOM confirming her appt 1/29/25 at 130pm. I asked she come 15mins prior to her appt to update her chart as needed. I also asked she CB if she needs to R/S or cancel.

## 2025-01-23 NOTE — TELEPHONE ENCOUNTER
RICK Luevano reminding as well as confirming her appt 1/29/25 at 130pm. I asked she come 15mins prior to her appt to update her chart as needed. I also asked she CB if she needs to R/S or cancel at her convenience.

## 2025-01-29 ENCOUNTER — OFFICE VISIT (OUTPATIENT)
Dept: NEUROLOGY | Facility: CLINIC | Age: 55
End: 2025-01-29
Payer: COMMERCIAL

## 2025-01-29 VITALS
OXYGEN SATURATION: 99 % | TEMPERATURE: 97.1 F | DIASTOLIC BLOOD PRESSURE: 74 MMHG | HEART RATE: 107 BPM | RESPIRATION RATE: 18 BRPM | WEIGHT: 161.2 LBS | BODY MASS INDEX: 26.86 KG/M2 | SYSTOLIC BLOOD PRESSURE: 120 MMHG | HEIGHT: 65 IN

## 2025-01-29 DIAGNOSIS — G35 MS (MULTIPLE SCLEROSIS) (HCC): Primary | ICD-10-CM

## 2025-01-29 DIAGNOSIS — R29.898 RIGHT LEG WEAKNESS: ICD-10-CM

## 2025-01-29 DIAGNOSIS — R29.898 RIGHT ARM WEAKNESS: ICD-10-CM

## 2025-01-29 PROCEDURE — 99215 OFFICE O/P EST HI 40 MIN: CPT | Performed by: PSYCHIATRY & NEUROLOGY

## 2025-01-29 RX ORDER — ERGOCALCIFEROL 1.25 MG/1
50000 CAPSULE, LIQUID FILLED ORAL WEEKLY
Qty: 12 CAPSULE | Refills: 0 | Status: SHIPPED | OUTPATIENT
Start: 2025-01-29 | End: 2025-01-29 | Stop reason: SDUPTHER

## 2025-01-29 RX ORDER — ERGOCALCIFEROL 1.25 MG/1
50000 CAPSULE, LIQUID FILLED ORAL WEEKLY
Qty: 12 CAPSULE | Refills: 0 | Status: SHIPPED | OUTPATIENT
Start: 2025-01-29

## 2025-01-29 RX ORDER — CYANOCOBALAMIN 1000 UG/ML
INJECTION, SOLUTION INTRAMUSCULAR; SUBCUTANEOUS
Qty: 9 ML | Refills: 0 | Status: SHIPPED | OUTPATIENT
Start: 2025-01-29

## 2025-01-29 NOTE — ASSESSMENT & PLAN NOTE
Patient will be advised to proceed with repeating imaging of the cervical spine concerning remote history of multiple sclerosis while taking teriflunomide 14 mg been initially described as stable.  Concern for recent relapse.  Orders:  •  MRI cervical spine with and without contrast; Future  •  ergocalciferol (VITAMIN D2) 50,000 units; Take 1 capsule (50,000 Units total) by mouth once a week

## 2025-01-29 NOTE — PROGRESS NOTES
Name: Chloé Dodson      : 1970      MRN: [id number]  Encounter Provider: Priya Mendes MD  Encounter Date: 2025   Encounter department: [facility] [place]  :  Assessment & Plan  MS (multiple sclerosis) ([facility])  Mrs. Dodson has presented to [facility] multiple sclerosis center for follow-up on multiple sclerosis and related concerns.  Patient described no new sensorimotor dysfunction but persistent right lower extremity transient heaviness/weakness with right upper extremity shakiness and numbness in the hand.  Patient has been taking teriflunomide 14 mg daily-we will continue same regimen as this year we will have imaging of the brain and spine based on findings we will make further plan of care.  Patient described 2 weeks ago event of right lower extremity severe pain with inability to ambulate with pain being subsided and patient with without assistive devices today.  MRI of thoracic spine stat will be advised with without contrast.    Patient will be also offered imaging of cervical spine-patient did not have any demyelination and prior imaging from .  Patient had stable radiographic findings of MS on recent brain MRI completed in 2024-imaging of the brain will be completed in late .    We discussed potential causes of acutely developed pain in the right lower extremity with a mass lesion at the T12 area in her spine likely will be a major cause of patient transient neurological deficit.  Event took place 2 weeks ago, I will avoid steroid treatment unless MRI thoracic spine come back with new lesions or expanding lesion.    Patient will be offered CBC/CMP/vitamin D level.  Last vitamin D was 45, patient will be advised to consider vitamin D2 50,000 international units if patient level has been decreasing, or she may continue vitamin D3 chewable vitamins with calcium.    Patient completed vitamin B12 1000 mcg intramuscular injection 3 months ago and  "patient can safely resume B12 1000 mcg intramuscular injection once a week for 4 weeks then once a month for 5 months.    Patient is to continue healthy dietary approach and regular physical activity.    Follow-up with [facility] neurology within 6 months.  Orders:  •  MRI thoracic spine with and without contrast; Future  •  MRI cervical spine with and without contrast; Future  •  CBC and differential; Future  •  Comprehensive metabolic panel  •  ergocalciferol (VITAMIN D2) 50,000 units; Take 1 capsule (50,000 Units total) by mouth once a week  •  SYRINGE-NEEDLE, DISP, 3 ML 25G X 1\" 3 ML MISC; Inject into a muscle every 28 days  •  cyanocobalamin 1,000 mcg/mL; Take B12 1000 mcg IM once a week for 4 weeks, then once a month for 5 months  •  Vitamin D 25 hydroxy; Future    Right leg weakness  Patient with known demyelination in her cervical or thoracic spine suggest significant improvement after chiropractic manipulation on her lower back pelvic region right leg weakness improves for short period of time only.   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  Patient would also benefit from having lumbar spine evaluation and we will proceed with lumbar x-ray for now.  Orders:  •  MRI thoracic spine with and without contrast; Future  •  XR spine lumbar minimum 4 views non injury; Future    Right arm weakness  Patient will be advised to proceed with repeating imaging of the cervical spine concerning remote history of multiple sclerosis while taking teriflunomide 14 mg been initially described as stable.  Concern for recent relapse.  Orders:  •  MRI cervical spine with and without contrast; Future  •  ergocalciferol (VITAMIN D2) 50,000 units; Take 1 capsule (50,000 Units total) by mouth once a week          History of Present Illness   HPI  Mrs. Dodson has presented to [facility] multiple sclerosis center for follow-up on multiple " sclerosis and related questions.  Patient has been taking teriflunomide 14 mg daily no side effects has been described no liver dysfunction been noted on her serological workup done in, January 2024.    Patient described event which took place 2 weeks ago where patient had suffered a right lower extremity severe pain and stiffness.  Patient stated 2 weeks ago she started with right lower extremity pain in her right thigh/hip region as she took ibuprofen.  When patient started developing bilateral lower extremity pain and stiffness.  Pain was described as 10/10 in severity and was predominantly in right hip area.  Pain was not radiating along her lower extremity.  Patient required use of cane as patient believes she had suffered from weakness in the right leg.  Patient also noted having some bodyaches and feeling more tired.  Patient states that she also had right upper shoulder pain and right foot paresthesia.  Patient has intermittent right hand numbness.  Patient stated her lower extremities are not even in length with right lower extremity shakiness and tremor reported after chiropractic manipulation.    Brain MRI completed on July 25, 2024 suggest stable chronic imaging disease within the cerebral hemisphere with mild plaque burden advised.  Imaging were compared to MRIs done in August 2023 with no disease progression noted.  Imaging from 2022 were compared to MRIs done in 2021.  Patient has several imaging plaque has resolved as patient had periventricular regions suggestive of great repairing capacity.     Patient completed cervical MRI in October 2022 with no signs of demyelination appreciated in cervical and upper thoracic regions.  Thoracic spine MRI completed in 2022 suggestive of stable small T12 spinal cord demyelination.     Serologic workup for HIV/hepatitis panel/immunoglobulins were done in January 2024 and within normal range.     Review of Systems   Constitutional:  Positive for fatigue. Negative  "for chills and fever.   HENT: Negative.  Negative for ear pain and sore throat.    Eyes:  Negative for visual disturbance.   Respiratory: Negative.  Negative for cough and shortness of breath.    Cardiovascular: Negative.  Negative for chest pain and palpitations.   Gastrointestinal: Negative.  Negative for abdominal pain and vomiting.   Endocrine: Negative.    Genitourinary: Negative.  Negative for dysuria and hematuria.   Musculoskeletal:  Positive for neck pain (with Rt shoulder pain). Negative for arthralgias and back pain. Gait problem: T25FW 7 seconds no assistance needed.  Skin: Negative.  Negative for color change and rash.   Allergic/Immunologic: Negative.    Neurological:  Positive for dizziness, tremors (on and off in right leg and foot), numbness and headaches (feels like pins in head). Negative for seizures and syncope.   Hematological: Negative.    Psychiatric/Behavioral: Negative.     All other systems reviewed and are negative.   I have personally reviewed the MA's review of systems and made changes as necessary.         Objective   /74 (BP Location: Left arm, Patient Position: Sitting, Cuff Size: Adult)   Pulse (!) 107   Temp (!) 97.1 °F (36.2 °C) (Temporal)   Resp 18   Ht 5' 5\" (1.651 m)   Wt 73.1 kg (161 lb 3.2 oz)   SpO2 99%   BMI 26.83 kg/m²     Physical Exam  Neurological Exam  CONSTITUTIONAL: NAD, pleasant. NECK: supple, no lymphadenopathy, no thyromegaly, no JVD. CARDIOVASCULAR: RRR, normal S1S2, no murmurs, no rubs. RESP: clear to auscultation bilaterally, no wheezes/rhonchi/rales. ABDOMEN: soft, non tender, non distended. SKIN: no rash or skin lesions. EXTREMITIES: no edema, pulses 2+bilaterally. PSYCH: appropriate mood and affect  NEUROLOGIC COMPREHENSIVE EXAM: Patient is oriented to person, place and time, NAD; appropriate affect. CN II, III, IV, V, VI, VII,VIII,IX,X,XI-XII intact with EOMI, PERRLA, OKN intact, VF grossly intact, fundi poorly visualized secondary to pupillary " constriction; symmetric face noted. Motor: 5/5 UE/LE bilateral symmetric; Sensory: intact to light touch and pinprick bilaterally; normal vibration sensation feet bilaterally; Coordination within normal limits on FTN and DAKOTA testing; DTR: 2/4 through, no Babinski, no clonus. Tandem gait is intact. Romberg: absent.

## 2025-01-29 NOTE — ASSESSMENT & PLAN NOTE
Mrs. Dodson has presented to [facility] multiple sclerosis center for follow-up on multiple sclerosis and related concerns.  Patient described no new sensorimotor dysfunction but persistent right lower extremity transient heaviness/weakness with right upper extremity shakiness and numbness in the hand.  Patient has been taking teriflunomide 14 mg daily-we will continue same regimen as this year we will have imaging of the brain and spine based on findings we will make further plan of care.  Patient described 2 weeks ago event of right lower extremity severe pain with inability to ambulate with pain being subsided and patient with without assistive devices today.  MRI of thoracic spine stat will be advised with without contrast.    Patient will be also offered imaging of cervical spine-patient did not have any demyelination and prior imaging from 2022.  Patient had stable radiographic findings of MS on recent brain MRI completed in July 2024-imaging of the brain will be completed in late 2025.    We discussed potential causes of acutely developed pain in the right lower extremity with a mass lesion at the T12 area in her spine likely will be a major cause of patient transient neurological deficit.  Event took place 2 weeks ago, I will avoid steroid treatment unless MRI thoracic spine come back with new lesions or expanding lesion.    Patient will be offered CBC/CMP/vitamin D level.  Last vitamin D was 45, patient will be advised to consider vitamin D2 50,000 international units if patient level has been decreasing, or she may continue vitamin D3 chewable vitamins with calcium.    Patient completed vitamin B12 1000 mcg intramuscular injection 3 months ago and patient can safely resume B12 1000 mcg intramuscular injection once a week for 4 weeks then once a month for 5 months.    Patient is to continue healthy dietary approach and regular physical activity.    Follow-up with [facility] neurology within 6  "months.  Orders:  •  MRI thoracic spine with and without contrast; Future  •  MRI cervical spine with and without contrast; Future  •  CBC and differential; Future  •  Comprehensive metabolic panel  •  ergocalciferol (VITAMIN D2) 50,000 units; Take 1 capsule (50,000 Units total) by mouth once a week  •  SYRINGE-NEEDLE, DISP, 3 ML 25G X 1\" 3 ML MISC; Inject into a muscle every 28 days  •  cyanocobalamin 1,000 mcg/mL; Take B12 1000 mcg IM once a week for 4 weeks, then once a month for 5 months  •  Vitamin D 25 hydroxy; Future  "

## 2025-01-30 ENCOUNTER — APPOINTMENT (OUTPATIENT)
Dept: LAB | Facility: HOSPITAL | Age: 55
End: 2025-01-30
Payer: COMMERCIAL

## 2025-01-30 ENCOUNTER — HOSPITAL ENCOUNTER (OUTPATIENT)
Dept: MRI IMAGING | Facility: HOSPITAL | Age: 55
End: 2025-01-30
Attending: PSYCHIATRY & NEUROLOGY
Payer: COMMERCIAL

## 2025-01-30 ENCOUNTER — HOSPITAL ENCOUNTER (OUTPATIENT)
Dept: RADIOLOGY | Facility: HOSPITAL | Age: 55
End: 2025-01-30
Payer: COMMERCIAL

## 2025-01-30 DIAGNOSIS — G35 MS (MULTIPLE SCLEROSIS) (HCC): ICD-10-CM

## 2025-01-30 DIAGNOSIS — R29.898 RIGHT LEG WEAKNESS: ICD-10-CM

## 2025-01-30 LAB
25(OH)D3 SERPL-MCNC: 33.8 NG/ML (ref 30–100)
ALBUMIN SERPL BCG-MCNC: 4.6 G/DL (ref 3.5–5)
ALP SERPL-CCNC: 55 U/L (ref 34–104)
ALT SERPL W P-5'-P-CCNC: 25 U/L (ref 7–52)
ANION GAP SERPL CALCULATED.3IONS-SCNC: 5 MMOL/L (ref 4–13)
AST SERPL W P-5'-P-CCNC: 15 U/L (ref 13–39)
BASOPHILS # BLD AUTO: 0.16 THOUSANDS/ΜL (ref 0–0.1)
BASOPHILS NFR BLD AUTO: 2 % (ref 0–1)
BILIRUB SERPL-MCNC: 0.55 MG/DL (ref 0.2–1)
BUN SERPL-MCNC: 11 MG/DL (ref 5–25)
CALCIUM SERPL-MCNC: 9.7 MG/DL (ref 8.4–10.2)
CHLORIDE SERPL-SCNC: 105 MMOL/L (ref 96–108)
CO2 SERPL-SCNC: 29 MMOL/L (ref 21–32)
CREAT SERPL-MCNC: 0.64 MG/DL (ref 0.6–1.3)
EOSINOPHIL # BLD AUTO: 0.77 THOUSAND/ΜL (ref 0–0.61)
EOSINOPHIL NFR BLD AUTO: 9 % (ref 0–6)
ERYTHROCYTE [DISTWIDTH] IN BLOOD BY AUTOMATED COUNT: 13.6 % (ref 11.6–15.1)
GFR SERPL CREATININE-BSD FRML MDRD: 101 ML/MIN/1.73SQ M
GLUCOSE P FAST SERPL-MCNC: 83 MG/DL (ref 65–99)
HCT VFR BLD AUTO: 46.2 % (ref 34.8–46.1)
HGB BLD-MCNC: 15.1 G/DL (ref 11.5–15.4)
IMM GRANULOCYTES # BLD AUTO: 0.02 THOUSAND/UL (ref 0–0.2)
IMM GRANULOCYTES NFR BLD AUTO: 0 % (ref 0–2)
LYMPHOCYTES # BLD AUTO: 3.57 THOUSANDS/ΜL (ref 0.6–4.47)
LYMPHOCYTES NFR BLD AUTO: 41 % (ref 14–44)
MCH RBC QN AUTO: 30.1 PG (ref 26.8–34.3)
MCHC RBC AUTO-ENTMCNC: 32.7 G/DL (ref 31.4–37.4)
MCV RBC AUTO: 92 FL (ref 82–98)
MONOCYTES # BLD AUTO: 0.74 THOUSAND/ΜL (ref 0.17–1.22)
MONOCYTES NFR BLD AUTO: 9 % (ref 4–12)
NEUTROPHILS # BLD AUTO: 3.3 THOUSANDS/ΜL (ref 1.85–7.62)
NEUTS SEG NFR BLD AUTO: 39 % (ref 43–75)
NRBC BLD AUTO-RTO: 0 /100 WBCS
PLATELET # BLD AUTO: 270 THOUSANDS/UL (ref 149–390)
PMV BLD AUTO: 10.8 FL (ref 8.9–12.7)
POTASSIUM SERPL-SCNC: 4.1 MMOL/L (ref 3.5–5.3)
PROT SERPL-MCNC: 7.4 G/DL (ref 6.4–8.4)
RBC # BLD AUTO: 5.01 MILLION/UL (ref 3.81–5.12)
SODIUM SERPL-SCNC: 139 MMOL/L (ref 135–147)
WBC # BLD AUTO: 8.56 THOUSAND/UL (ref 4.31–10.16)

## 2025-01-30 PROCEDURE — 72110 X-RAY EXAM L-2 SPINE 4/>VWS: CPT

## 2025-01-30 PROCEDURE — 72157 MRI CHEST SPINE W/O & W/DYE: CPT

## 2025-01-30 PROCEDURE — 82306 VITAMIN D 25 HYDROXY: CPT

## 2025-01-30 PROCEDURE — 85025 COMPLETE CBC W/AUTO DIFF WBC: CPT

## 2025-01-30 PROCEDURE — A9585 GADOBUTROL INJECTION: HCPCS | Performed by: PSYCHIATRY & NEUROLOGY

## 2025-01-30 PROCEDURE — 80053 COMPREHEN METABOLIC PANEL: CPT | Performed by: PSYCHIATRY & NEUROLOGY

## 2025-01-30 PROCEDURE — 36415 COLL VENOUS BLD VENIPUNCTURE: CPT

## 2025-01-30 RX ORDER — GADOBUTROL 604.72 MG/ML
7 INJECTION INTRAVENOUS
Status: COMPLETED | OUTPATIENT
Start: 2025-01-30 | End: 2025-01-30

## 2025-01-30 RX ADMIN — GADOBUTROL 7 ML: 604.72 INJECTION INTRAVENOUS at 13:41

## 2025-02-11 ENCOUNTER — TELEPHONE (OUTPATIENT)
Age: 55
End: 2025-02-11

## 2025-02-11 NOTE — TELEPHONE ENCOUNTER
Outbound call placed to patient, no answer. Left generic message requesting a call back to discuss message below or to respond via [employer] message. APXt message sent to pt.

## 2025-02-11 NOTE — TELEPHONE ENCOUNTER
Unchanged chronic demyelinating plaque of the thoracic cord at the T12 level. No new thoracic cord lesions. No abnormal enhancement to suggest active demyelination.

## 2025-02-26 DIAGNOSIS — G37.9 CNS DEMYELINATION (HCC): ICD-10-CM

## 2025-02-26 DIAGNOSIS — R29.898 RIGHT ARM WEAKNESS: ICD-10-CM

## 2025-02-26 DIAGNOSIS — F43.22 ADJUSTMENT DISORDER WITH ANXIOUS MOOD: ICD-10-CM

## 2025-02-26 RX ORDER — FLUOXETINE 10 MG/1
10 CAPSULE ORAL DAILY
Qty: 30 CAPSULE | Refills: 5 | Status: SHIPPED | OUTPATIENT
Start: 2025-02-26

## 2025-02-28 ENCOUNTER — HOSPITAL ENCOUNTER (OUTPATIENT)
Dept: MRI IMAGING | Facility: HOSPITAL | Age: 55
Discharge: HOME | End: 2025-02-28
Attending: PSYCHIATRY & NEUROLOGY
Payer: COMMERCIAL

## 2025-02-28 DIAGNOSIS — R29.898 RIGHT ARM WEAKNESS: ICD-10-CM

## 2025-02-28 DIAGNOSIS — G35 MS (MULTIPLE SCLEROSIS) (HCC): ICD-10-CM

## 2025-02-28 PROCEDURE — A9585 GADOBUTROL INJECTION: HCPCS | Performed by: PSYCHIATRY & NEUROLOGY

## 2025-02-28 PROCEDURE — 72156 MRI NECK SPINE W/O & W/DYE: CPT

## 2025-02-28 RX ORDER — GADOBUTROL 604.72 MG/ML
7 INJECTION INTRAVENOUS
Status: COMPLETED | OUTPATIENT
Start: 2025-02-28 | End: 2025-02-28

## 2025-02-28 RX ADMIN — GADOBUTROL 7 ML: 604.72 INJECTION INTRAVENOUS at 17:32

## 2025-03-02 ENCOUNTER — OFFICE VISIT (OUTPATIENT)
Dept: URGENT CARE | Facility: CLINIC | Age: 55
End: 2025-03-02
Payer: COMMERCIAL

## 2025-03-02 VITALS
TEMPERATURE: 97.4 F | OXYGEN SATURATION: 96 % | BODY MASS INDEX: 26.82 KG/M2 | SYSTOLIC BLOOD PRESSURE: 122 MMHG | HEIGHT: 65 IN | DIASTOLIC BLOOD PRESSURE: 76 MMHG | HEART RATE: 99 BPM | WEIGHT: 161 LBS | RESPIRATION RATE: 16 BRPM

## 2025-03-02 DIAGNOSIS — N30.90 CYSTITIS: Primary | ICD-10-CM

## 2025-03-02 LAB
SL AMB  POCT GLUCOSE, UA: ABNORMAL
SL AMB LEUKOCYTE ESTERASE,UA: ABNORMAL
SL AMB POCT BILIRUBIN,UA: ABNORMAL
SL AMB POCT BLOOD,UA: ABNORMAL
SL AMB POCT CLARITY,UA: ABNORMAL
SL AMB POCT COLOR,UA: YELLOW
SL AMB POCT KETONES,UA: ABNORMAL
SL AMB POCT NITRITE,UA: ABNORMAL
SL AMB POCT PH,UA: 6
SL AMB POCT SPECIFIC GRAVITY,UA: 1.02
SL AMB POCT URINE PROTEIN: ABNORMAL
SL AMB POCT UROBILINOGEN: 0.2

## 2025-03-02 PROCEDURE — 87186 SC STD MICRODIL/AGAR DIL: CPT

## 2025-03-02 PROCEDURE — 99213 OFFICE O/P EST LOW 20 MIN: CPT

## 2025-03-02 PROCEDURE — 87077 CULTURE AEROBIC IDENTIFY: CPT

## 2025-03-02 PROCEDURE — 87086 URINE CULTURE/COLONY COUNT: CPT

## 2025-03-02 PROCEDURE — 81002 URINALYSIS NONAUTO W/O SCOPE: CPT

## 2025-03-02 RX ORDER — NITROFURANTOIN 25; 75 MG/1; MG/1
100 CAPSULE ORAL 2 TIMES DAILY
Qty: 10 CAPSULE | Refills: 0 | Status: SHIPPED | OUTPATIENT
Start: 2025-03-02 | End: 2025-03-07

## 2025-03-02 RX ORDER — PHENAZOPYRIDINE HYDROCHLORIDE 100 MG/1
100 TABLET, FILM COATED ORAL 3 TIMES DAILY PRN
Qty: 10 TABLET | Refills: 0 | Status: SHIPPED | OUTPATIENT
Start: 2025-03-02

## 2025-03-02 NOTE — PROGRESS NOTES
[facility]        NAME: Chloé Dodson is a 54 y.o. female  : 1970    MRN: [id number]  DATE: 2025  TIME: 11:25 AM    Assessment and Plan   Cystitis [N30.90]  1. Cystitis  POCT urine dip    Urine culture    Urine culture    phenazopyridine (PYRIDIUM) 100 mg tablet    nitrofurantoin (MACROBID) 100 mg capsule      Urine poct positive for leukocytes, nitrates and blood      Patient Instructions   Take Phenazopyridine (after meals) and Nitrofurantoin as prescribed  Urine discoloration may occur with Phenazopyridine  Drink plenty of water   Cranberry supplements  Urinate within 5 minutes following intercourse  Follow up with OB/GYN    Follow up with PCP in 3-5 days.  Proceed to  ER if symptoms worsen.    If tests are performed, our office will contact you with results only if changes need to made to the care plan discussed with you at the visit. [facility]    Chief Complaint     Chief Complaint   Patient presents with    Difficulty Urinating     Since Friday, pt c/o pressure and uncomfortable feeling at the end of stream. Pt medicated with ibuprofen 800mg with some relief.          History of Present Illness       Pablo presents with a 3 day history of burning with urinating and pressure. She is taking ibuprofen with mild relief. Patient thinks she may have passed a stone in the toilet denies pain     Difficulty Urinating   This is a new problem. The current episode started yesterday. The problem occurs every urination. The problem has been gradually improving. The quality of the pain is described as aching. The pain is at a severity of 8/10. The patient is experiencing no pain. There has been no fever. The fever has been present for Less than 1 day. She is Sexually active. There is No history of pyelonephritis. Associated symptoms include frequency, hesitancy and urgency. Pertinent negatives include no chills, discharge, flank pain, hematuria,  nausea, possible pregnancy, sweats or vomiting.       Review of Systems   Review of Systems   Constitutional:  Negative for chills.   Gastrointestinal:  Negative for nausea and vomiting.   Genitourinary:  Positive for dysuria, frequency, hesitancy and urgency. Negative for flank pain and hematuria.   All other systems reviewed and are negative.        Current Medications       Current Outpatient Medications:     cholecalciferol (VITAMIN D3) 1,000 units tablet, Take 1,000 Units by mouth daily Take 1tabs (1000mg) PO daily, Disp: , Rfl:     cyanocobalamin 1,000 mcg/mL, inject 1 MILLILITER intramuscularly every week for 4 weeks then Every Month for 5 MONTHS, Disp: 9 mL, Rfl: 0    cyanocobalamin 1,000 mcg/mL, Take B12 1000 mcg IM once a week for 4 weeks, then once a month for 5 months, Disp: 9 mL, Rfl: 0    ergocalciferol (VITAMIN D2) 50,000 units, Take 1 capsule (50,000 Units total) by mouth once a week, Disp: 12 capsule, Rfl: 0    FLUoxetine (PROzac) 10 mg capsule, take 1 capsule by mouth once daily, Disp: 30 capsule, Rfl: 5    fluticasone (FLONASE) 50 mcg/act nasal spray, 2 sprays into each nostril daily, Disp: 11.1 mL, Rfl: 0    ibuprofen (MOTRIN) 800 mg tablet, take 1 tablet by mouth every 8 hours if needed for moderate pain, Disp: 90 tablet, Rfl: 2    nitrofurantoin (MACROBID) 100 mg capsule, Take 1 capsule (100 mg total) by mouth 2 (two) times a day for 5 days, Disp: 10 capsule, Rfl: 0    phenazopyridine (PYRIDIUM) 100 mg tablet, Take 1 tablet (100 mg total) by mouth 3 (three) times a day as needed for bladder spasms, Disp: 10 tablet, Rfl: 0    Teriflunomide 14 MG TABS, Take 1 tablet (14 mg total) by mouth in the morning, Disp: 90 tablet, Rfl: 3    amoxicillin (AMOXIL) 500 MG tablet, Take 500 mg by mouth every 8 (eight) hours (Patient not taking: Reported on 7/29/2024), Disp: , Rfl:     Loratadine 10 MG CAPS, Take by mouth as needed (Patient not taking: Reported on 1/29/2025), Disp: , Rfl:     SYRINGE-NEEDLE,  "DISP, 3 ML 25G X 1\" 3 ML MISC, Inject into a muscle every 28 days, Disp: 9 each, Rfl: 0    SYRINGE-NEEDLE, DISP, 3 ML 25G X 1\" 3 ML MISC, Inject into a muscle every 28 days, Disp: 9 each, Rfl: 0    Current Allergies     Allergies as of 03/02/2025 - Reviewed 03/02/2025   Allergen Reaction Noted    Sulfa antibiotics Other (See Comments) 10/10/2022    Amoxicillin-pot clavulanate Diarrhea 01/13/2022    Molds & smuts Fatigue 11/15/2021            The following portions of the patient's history were reviewed and updated as appropriate: allergies, current medications, past family history, past medical history, past social history, past surgical history and problem list.     Past Medical History:   Diagnosis Date    MS (multiple sclerosis) ([facility])        History reviewed. No pertinent surgical history.    History reviewed. No pertinent family history.      Medications have been verified.        Objective   /76 (BP Location: Right arm, Patient Position: Sitting, Cuff Size: Adult)   Pulse 99   Temp (!) 97.4 °F (36.3 °C) (Tympanic)   Resp 16   Ht 5' 5\" (1.651 m)   Wt 73 kg (161 lb)   SpO2 96%   BMI 26.79 kg/m²        Physical Exam     Physical Exam  Vitals and nursing note reviewed.   Constitutional:       General: She is not in acute distress.  HENT:      Head: Normocephalic.      Right Ear: Tympanic membrane and ear canal normal.      Left Ear: Tympanic membrane and ear canal normal.      Nose: No congestion.      Mouth/Throat:      Mouth: Mucous membranes are moist.      Pharynx: No pharyngeal swelling, oropharyngeal exudate or posterior oropharyngeal erythema.      Tonsils: No tonsillar exudate.   Eyes:      Conjunctiva/sclera: Conjunctivae normal.      Pupils: Pupils are equal, round, and reactive to light.   Cardiovascular:      Rate and Rhythm: Normal rate and regular rhythm.      Heart sounds: Normal heart sounds. No murmur heard.  Pulmonary:      Effort: Pulmonary effort is normal. No respiratory distress. "      Breath sounds: Normal breath sounds. No wheezing, rhonchi or rales.   Abdominal:      General: Abdomen is flat. Bowel sounds are normal. There is no distension.      Palpations: Abdomen is soft.      Tenderness: There is no abdominal tenderness. There is no right CVA tenderness, left CVA tenderness or guarding.   Musculoskeletal:      Cervical back: Neck supple. No tenderness.   Lymphadenopathy:      Cervical: No cervical adenopathy.   Skin:     General: Skin is warm and dry.   Neurological:      Mental Status: She is alert.

## 2025-03-02 NOTE — PATIENT INSTRUCTIONS
Take Phenazopyridine (after meals) and Nitrofurantoin as prescribed  Urine discoloration may occur with Phenazopyridine  Drink plenty of water   Cranberry supplements  Urinate within 5 minutes following intercourse  Follow up with OB/GYN    Follow up with PCP in 3-5 days.  Proceed to  ER if symptoms worsen.    If tests are performed, our office will contact you with results only if changes need to made to the care plan discussed with you at the visit. You can review your full results on [facility].

## 2025-03-03 ENCOUNTER — RESULTS FOLLOW-UP (OUTPATIENT)
Dept: URGENT CARE | Facility: CLINIC | Age: 55
End: 2025-03-03

## 2025-03-04 LAB — BACTERIA UR CULT: ABNORMAL

## 2025-04-11 ENCOUNTER — OFFICE VISIT (OUTPATIENT)
Dept: URGENT CARE | Facility: CLINIC | Age: 55
End: 2025-04-11
Payer: COMMERCIAL

## 2025-04-11 VITALS
HEIGHT: 65 IN | BODY MASS INDEX: 27.32 KG/M2 | WEIGHT: 164 LBS | OXYGEN SATURATION: 98 % | DIASTOLIC BLOOD PRESSURE: 73 MMHG | SYSTOLIC BLOOD PRESSURE: 120 MMHG | RESPIRATION RATE: 18 BRPM | HEART RATE: 97 BPM | TEMPERATURE: 98.1 F

## 2025-04-11 DIAGNOSIS — J06.9 VIRAL URI WITH COUGH: Primary | ICD-10-CM

## 2025-04-11 PROCEDURE — 99213 OFFICE O/P EST LOW 20 MIN: CPT

## 2025-04-11 RX ORDER — METHYLPREDNISOLONE 4 MG/1
TABLET ORAL
Qty: 1 EACH | Refills: 0 | Status: SHIPPED | OUTPATIENT
Start: 2025-04-11

## 2025-04-11 NOTE — PROGRESS NOTES
[facility]        NAME: Chloé Dodson is a 54 y.o. female  : 1970    MRN: [id number]  DATE: 2025  TIME: 10:04 AM    Assessment and Plan   Viral URI with cough [J06.9]  1. Viral URI with cough  methylPREDNISolone 4 MG tablet therapy pack        Suspect viral illness. Normal lung exam. Steroid Rx and OTC treatments recommended.  Follow up if symptoms worsen.     Patient Instructions   Take steroids prescribed    Viral symptoms may last for a total of 1-3 weeks. Symptoms typically start with a sore throat and progress to include sinus pain/pressure, runny nose (yellow/green), and congestion/cough. The yellow/green color does not necessarily indicate a bacterial sinus infection. If your sinus symptoms worsen on day 10-14, you may want to consider re-evaluation for a possible secondary bacterial sinus infection. Coughs are typically most bothersome the first 1-2 weeks. Coughs frequently linger for 4-6 weeks. However, have your lungs re-evaluated if you develop sudden worsening cough, shortness or breath or chest discomfort.       Vitamin D3 2000 IU daily  Vitamin C 1000mg twice per day  Some studies suggest that Zinc 12.5-15mg every 2 hours while awake x 5 days may shorten the duration cold symptoms by 1-2 days.   Fluids and rest  Nasal saline spray (Extra Strength) 3 drops in each nostril 4x per day may shorten the duration of illness by 1-2 days and help prevent spread.  Afrin if severe congestion (do not use for more than 3 days)  Over the counter cold medication as needed (EX: Mucinex DM, Sudafed I.e. pseudoephedrine, Tylenol, Flonase)  Sinus Rinses (use distilled water only and carefully follow instructions)  Salt water gargles and chloraseptic spray  Throat Coat Tea (herbal licorice root tea for sore throat-add honey unless diabetic)  Warm compresses over sinuses  Steam treatment (utilize proper safety precautions when in contact with hot water/steam)    Follow up with PCP in 3-5  days.  Proceed to  ER if symptoms worsen.    If tests have been performed at [facility], our office will contact you with results if changes need to be made to the care plan discussed with you at the visit.  You can [facility] your full [facility].    Chief Complaint     Chief Complaint   Patient presents with    Fatigue    Cough    Chest Congestion      Chest tightness and burning in her Chest. No sore throat just more of a tickle in her throat she states. She does have MS as well          History of Present Illness       55yo female with Hx of MS presents for 3 days of cough and congestion. She also notes some chest tightness which she gets when she has bronchitis. Denies fever, chest pain, shortness of breath. Has been using Vicks with minimal relief.    Fatigue  Associated symptoms include congestion, coughing and fatigue. Pertinent negatives include no chest pain, chills, fever, sore throat or vomiting.   Cough  Associated symptoms include rhinorrhea. Pertinent negatives include no chest pain, chills, ear pain, fever, sore throat, shortness of breath or wheezing.       Review of Systems   Review of Systems   Constitutional:  Positive for fatigue. Negative for chills and fever.   HENT:  Positive for congestion and rhinorrhea. Negative for ear pain and sore throat.    Respiratory:  Positive for cough and chest tightness. Negative for shortness of breath and wheezing.    Cardiovascular:  Negative for chest pain.   Gastrointestinal:  Negative for diarrhea and vomiting.         Current Medications       Current Outpatient Medications:     amoxicillin (AMOXIL) 500 MG tablet, Take 500 mg by mouth every 8 (eight) hours, Disp: , Rfl:     cholecalciferol (VITAMIN D3) 1,000 units tablet, Take 1,000 Units by mouth daily Take 1tabs (1000mg) PO daily, Disp: , Rfl:     cyanocobalamin 1,000 mcg/mL, inject 1 MILLILITER intramuscularly every week for 4 weeks then Every Month for 5 MONTHS, Disp: 9 mL, Rfl: 0     "cyanocobalamin 1,000 mcg/mL, Take B12 1000 mcg IM once a week for 4 weeks, then once a month for 5 months, Disp: 9 mL, Rfl: 0    ergocalciferol (VITAMIN D2) 50,000 units, Take 1 capsule (50,000 Units total) by mouth once a week, Disp: 12 capsule, Rfl: 0    FLUoxetine (PROzac) 10 mg capsule, take 1 capsule by mouth once daily, Disp: 30 capsule, Rfl: 5    fluticasone (FLONASE) 50 mcg/act nasal spray, 2 sprays into each nostril daily, Disp: 11.1 mL, Rfl: 0    ibuprofen (MOTRIN) 800 mg tablet, take 1 tablet by mouth every 8 hours if needed for moderate pain, Disp: 90 tablet, Rfl: 2    Loratadine 10 MG CAPS, Take by mouth as needed, Disp: , Rfl:     methylPREDNISolone 4 MG tablet therapy pack, Use as directed on package, Disp: 1 each, Rfl: 0    phenazopyridine (PYRIDIUM) 100 mg tablet, Take 1 tablet (100 mg total) by mouth 3 (three) times a day as needed for bladder spasms, Disp: 10 tablet, Rfl: 0    SYRINGE-NEEDLE, DISP, 3 ML 25G X 1\" 3 ML MISC, Inject into a muscle every 28 days, Disp: 9 each, Rfl: 0    SYRINGE-NEEDLE, DISP, 3 ML 25G X 1\" 3 ML MISC, Inject into a muscle every 28 days, Disp: 9 each, Rfl: 0    Teriflunomide 14 MG TABS, Take 1 tablet (14 mg total) by mouth in the morning, Disp: 90 tablet, Rfl: 3    Current Allergies     Allergies as of 04/11/2025 - Reviewed 04/11/2025   Allergen Reaction Noted    Sulfa antibiotics Other (See Comments) 10/10/2022    Amoxicillin-pot clavulanate Diarrhea 01/13/2022    Molds & smuts Fatigue 11/15/2021            The following portions of the patient's history were reviewed and updated as appropriate: allergies, current medications, past family history, past medical history, past social history, past surgical history and problem list.     Past Medical History:   Diagnosis Date    MS (multiple sclerosis) ([facility])        History reviewed. No pertinent surgical history.    History reviewed. No pertinent family history.      Medications have been verified.        Objective   BP " "120/73 (BP Location: Right arm, Patient Position: Sitting, Cuff Size: Adult)   Pulse 97   Temp 98.1 °F (36.7 °C) (Temporal)   Resp 18   Ht 5' 5\" (1.651 m)   Wt 74.4 kg (164 lb)   SpO2 98%   BMI 27.29 kg/m²   No LMP recorded.       Physical Exam     Physical Exam  Vitals and nursing note reviewed.   Constitutional:       General: She is not in acute distress.     Appearance: Normal appearance. She is not ill-appearing, toxic-appearing or diaphoretic.   HENT:      Head: Normocephalic and atraumatic.      Right Ear: Tympanic membrane, ear canal and external ear normal.      Left Ear: Tympanic membrane, ear canal and external ear normal.      Nose: Congestion present.      Mouth/Throat:      Mouth: Mucous membranes are moist.      Pharynx: No oropharyngeal exudate or posterior oropharyngeal erythema.   Eyes:      Conjunctiva/sclera: Conjunctivae normal.   Cardiovascular:      Rate and Rhythm: Normal rate and regular rhythm.      Heart sounds: Normal heart sounds. No murmur heard.     No friction rub. No gallop.   Pulmonary:      Effort: Pulmonary effort is normal. No respiratory distress.      Breath sounds: Normal breath sounds. No stridor. No wheezing, rhonchi or rales.   Musculoskeletal:      Cervical back: No rigidity.   Lymphadenopathy:      Cervical: No cervical adenopathy.   Skin:     General: Skin is warm and dry.      Capillary Refill: Capillary refill takes less than 2 seconds.   Neurological:      General: No focal deficit present.      Mental Status: She is alert.   Psychiatric:         Mood and Affect: Mood normal.         Behavior: Behavior normal.                   "

## 2025-04-11 NOTE — PATIENT INSTRUCTIONS
Take steroids prescribed    Viral symptoms may last for a total of 1-3 weeks. Symptoms typically start with a sore throat and progress to include sinus pain/pressure, runny nose (yellow/green), and congestion/cough. The yellow/green color does not necessarily indicate a bacterial sinus infection. If your sinus symptoms worsen on day 10-14, you may want to consider re-evaluation for a possible secondary bacterial sinus infection. Coughs are typically most bothersome the first 1-2 weeks. Coughs frequently linger for 4-6 weeks. However, have your lungs re-evaluated if you develop sudden worsening cough, shortness or breath or chest discomfort.       Vitamin D3 2000 IU daily  Vitamin C 1000mg twice per day  Some studies suggest that Zinc 12.5-15mg every 2 hours while awake x 5 days may shorten the duration cold symptoms by 1-2 days.   Fluids and rest  Nasal saline spray (Extra Strength) 3 drops in each nostril 4x per day may shorten the duration of illness by 1-2 days and help prevent spread.  Afrin if severe congestion (do not use for more than 3 days)  Over the counter cold medication as needed (EX: Mucinex DM, Sudafed I.e. pseudoephedrine, Tylenol, Flonase)  Sinus Rinses (use distilled water only and carefully follow instructions)  Salt water gargles and chloraseptic spray  Throat Coat Tea (herbal licorice root tea for sore throat-add honey unless diabetic)  Warm compresses over sinuses  Steam treatment (utilize proper safety precautions when in contact with hot water/steam)

## 2025-04-21 DIAGNOSIS — G35 MS (MULTIPLE SCLEROSIS) (HCC): ICD-10-CM

## 2025-04-21 DIAGNOSIS — R29.898 RIGHT ARM WEAKNESS: ICD-10-CM

## 2025-04-22 RX ORDER — ERGOCALCIFEROL 1.25 MG/1
50000 CAPSULE, LIQUID FILLED ORAL WEEKLY
Qty: 12 CAPSULE | Refills: 0 | Status: SHIPPED | OUTPATIENT
Start: 2025-04-22

## 2025-05-09 ENCOUNTER — HOSPITAL ENCOUNTER (OUTPATIENT)
Dept: MRI IMAGING | Facility: HOSPITAL | Age: 55
End: 2025-05-09
Payer: COMMERCIAL

## 2025-05-09 ENCOUNTER — APPOINTMENT (EMERGENCY)
Dept: CT IMAGING | Facility: HOSPITAL | Age: 55
End: 2025-05-09
Payer: COMMERCIAL

## 2025-05-09 ENCOUNTER — NURSE TRIAGE (OUTPATIENT)
Age: 55
End: 2025-05-09

## 2025-05-09 ENCOUNTER — HOSPITAL ENCOUNTER (EMERGENCY)
Facility: HOSPITAL | Age: 55
Discharge: HOME | End: 2025-05-09
Attending: EMERGENCY MEDICINE
Payer: COMMERCIAL

## 2025-05-09 VITALS
DIASTOLIC BLOOD PRESSURE: 66 MMHG | RESPIRATION RATE: 20 BRPM | HEART RATE: 86 BPM | SYSTOLIC BLOOD PRESSURE: 109 MMHG | BODY MASS INDEX: 27.33 KG/M2 | OXYGEN SATURATION: 95 % | HEIGHT: 65 IN | WEIGHT: 164.02 LBS | TEMPERATURE: 97.6 F

## 2025-05-09 DIAGNOSIS — R47.1 DYSARTHRIA: ICD-10-CM

## 2025-05-09 DIAGNOSIS — G35 MS (MULTIPLE SCLEROSIS) (HCC): ICD-10-CM

## 2025-05-09 DIAGNOSIS — G35 MS (MULTIPLE SCLEROSIS) (HCC): Primary | ICD-10-CM

## 2025-05-09 DIAGNOSIS — R47.89 EPISODE OF CHANGE IN SPEECH: Primary | ICD-10-CM

## 2025-05-09 DIAGNOSIS — G35 HISTORY OF MULTIPLE SCLEROSIS (HCC): ICD-10-CM

## 2025-05-09 LAB
ALBUMIN SERPL BCG-MCNC: 4.8 G/DL (ref 3.5–5)
ALP SERPL-CCNC: 63 U/L (ref 34–104)
ALT SERPL W P-5'-P-CCNC: 28 U/L (ref 7–52)
ANION GAP SERPL CALCULATED.3IONS-SCNC: 9 MMOL/L (ref 4–13)
AST SERPL W P-5'-P-CCNC: 18 U/L (ref 13–39)
ATRIAL RATE: 102 BPM
BASOPHILS # BLD AUTO: 0.19 THOUSANDS/ÂΜL (ref 0–0.1)
BASOPHILS NFR BLD AUTO: 2 % (ref 0–1)
BILIRUB SERPL-MCNC: 0.42 MG/DL (ref 0.2–1)
BUN SERPL-MCNC: 10 MG/DL (ref 5–25)
CALCIUM SERPL-MCNC: 9.9 MG/DL (ref 8.4–10.2)
CHLORIDE SERPL-SCNC: 103 MMOL/L (ref 96–108)
CHOLEST SERPL-MCNC: 268 MG/DL (ref ?–200)
CO2 SERPL-SCNC: 29 MMOL/L (ref 21–32)
CREAT SERPL-MCNC: 0.64 MG/DL (ref 0.6–1.3)
EOSINOPHIL # BLD AUTO: 0.89 THOUSAND/ÂΜL (ref 0–0.61)
EOSINOPHIL NFR BLD AUTO: 9 % (ref 0–6)
ERYTHROCYTE [DISTWIDTH] IN BLOOD BY AUTOMATED COUNT: 14 % (ref 11.6–15.1)
EST. AVERAGE GLUCOSE BLD GHB EST-MCNC: 120 MG/DL
FLUAV AG UPPER RESP QL IA.RAPID: NEGATIVE
FLUBV AG UPPER RESP QL IA.RAPID: NEGATIVE
GFR SERPL CREATININE-BSD FRML MDRD: 101 ML/MIN/1.73SQ M
GLUCOSE SERPL-MCNC: 85 MG/DL (ref 65–140)
HBA1C MFR BLD: 5.8 %
HCT VFR BLD AUTO: 46.7 % (ref 34.8–46.1)
HDLC SERPL-MCNC: 67 MG/DL
HGB BLD-MCNC: 15.3 G/DL (ref 11.5–15.4)
IMM GRANULOCYTES # BLD AUTO: 0.04 THOUSAND/UL (ref 0–0.2)
IMM GRANULOCYTES NFR BLD AUTO: 0 % (ref 0–2)
LDLC SERPL CALC-MCNC: 177 MG/DL (ref 0–100)
LYMPHOCYTES # BLD AUTO: 3.36 THOUSANDS/ÂΜL (ref 0.6–4.47)
LYMPHOCYTES NFR BLD AUTO: 35 % (ref 14–44)
MCH RBC QN AUTO: 30.2 PG (ref 26.8–34.3)
MCHC RBC AUTO-ENTMCNC: 32.8 G/DL (ref 31.4–37.4)
MCV RBC AUTO: 92 FL (ref 82–98)
MONOCYTES # BLD AUTO: 0.65 THOUSAND/ÂΜL (ref 0.17–1.22)
MONOCYTES NFR BLD AUTO: 7 % (ref 4–12)
NEUTROPHILS # BLD AUTO: 4.6 THOUSANDS/ÂΜL (ref 1.85–7.62)
NEUTS SEG NFR BLD AUTO: 47 % (ref 43–75)
NONHDLC SERPL-MCNC: 201 MG/DL
NRBC BLD AUTO-RTO: 0 /100 WBCS
P AXIS: 72 DEGREES
PLATELET # BLD AUTO: 298 THOUSANDS/UL (ref 149–390)
PMV BLD AUTO: 10.9 FL (ref 8.9–12.7)
POTASSIUM SERPL-SCNC: 3.2 MMOL/L (ref 3.5–5.3)
PR INTERVAL: 140 MS
PROT SERPL-MCNC: 7.6 G/DL (ref 6.4–8.4)
QRS AXIS: 78 DEGREES
QRSD INTERVAL: 78 MS
QT INTERVAL: 364 MS
QTC INTERVAL: 474 MS
RBC # BLD AUTO: 5.06 MILLION/UL (ref 3.81–5.12)
SARS-COV+SARS-COV-2 AG RESP QL IA.RAPID: NEGATIVE
SODIUM SERPL-SCNC: 141 MMOL/L (ref 135–147)
T WAVE AXIS: 59 DEGREES
TRIGL SERPL-MCNC: 120 MG/DL (ref ?–150)
VENTRICULAR RATE: 102 BPM
WBC # BLD AUTO: 9.73 THOUSAND/UL (ref 4.31–10.16)

## 2025-05-09 PROCEDURE — 99285 EMERGENCY DEPT VISIT HI MDM: CPT

## 2025-05-09 PROCEDURE — 70496 CT ANGIOGRAPHY HEAD: CPT

## 2025-05-09 PROCEDURE — 87811 SARS-COV-2 COVID19 W/OPTIC: CPT | Performed by: EMERGENCY MEDICINE

## 2025-05-09 PROCEDURE — 70498 CT ANGIOGRAPHY NECK: CPT

## 2025-05-09 PROCEDURE — 36415 COLL VENOUS BLD VENIPUNCTURE: CPT | Performed by: EMERGENCY MEDICINE

## 2025-05-09 PROCEDURE — 80061 LIPID PANEL: CPT | Performed by: EMERGENCY MEDICINE

## 2025-05-09 PROCEDURE — 85025 COMPLETE CBC W/AUTO DIFF WBC: CPT | Performed by: EMERGENCY MEDICINE

## 2025-05-09 PROCEDURE — 80053 COMPREHEN METABOLIC PANEL: CPT | Performed by: EMERGENCY MEDICINE

## 2025-05-09 PROCEDURE — A9585 GADOBUTROL INJECTION: HCPCS | Performed by: RADIOLOGY

## 2025-05-09 PROCEDURE — 93005 ELECTROCARDIOGRAM TRACING: CPT

## 2025-05-09 PROCEDURE — 70553 MRI BRAIN STEM W/O & W/DYE: CPT

## 2025-05-09 PROCEDURE — 87804 INFLUENZA ASSAY W/OPTIC: CPT | Performed by: EMERGENCY MEDICINE

## 2025-05-09 PROCEDURE — 83036 HEMOGLOBIN GLYCOSYLATED A1C: CPT | Performed by: EMERGENCY MEDICINE

## 2025-05-09 PROCEDURE — 99285 EMERGENCY DEPT VISIT HI MDM: CPT | Performed by: EMERGENCY MEDICINE

## 2025-05-09 RX ORDER — ATORVASTATIN CALCIUM 40 MG/1
40 TABLET, FILM COATED ORAL DAILY
Qty: 30 TABLET | Refills: 0 | Status: SHIPPED | OUTPATIENT
Start: 2025-05-09

## 2025-05-09 RX ORDER — GADOBUTROL 604.72 MG/ML
7 INJECTION INTRAVENOUS
Status: COMPLETED | OUTPATIENT
Start: 2025-05-09 | End: 2025-05-09

## 2025-05-09 RX ADMIN — GADOBUTROL 7 ML: 604.72 INJECTION INTRAVENOUS at 21:09

## 2025-05-09 RX ADMIN — IOHEXOL 85 ML: 350 INJECTION, SOLUTION INTRAVENOUS at 13:36

## 2025-05-09 NOTE — TELEPHONE ENCOUNTER
Called and informed pt that MRI Brain w/wo contrast was placed. Warm transfer to CS for pt to schedule MRI. Unable to schedule stating MRI script was not placed as STAT.     Secure chat message sent to provider. Updated MRI script to STAT as provider requested.

## 2025-05-09 NOTE — TELEPHONE ENCOUNTER
"FOLLOW UP: Discuss with provider and call patient with recommendation; did discuss ED for evaluation despite symptoms resolved however patient said if symptoms recur, she will go to ED for assessment.    REASON FOR CONVERSATION: Speech Problem    SYMPTOMS: jibberish speech lasting approx 5-10 seconds x 1 last evening, \"mis-step\" while ambulating, mild headache which patient feels is attributed to sinus/allergies.    OTHER: Patient agreeable to go to ED if recurs. Patient has hx of MS    DISPOSITION: Go to ED/[facility] (Or to Office with PCP Approval)      Reason for Disposition   Neurologic deficit that was brief (now gone), ANY of the following: * Weakness of the face, arm, or leg on one side of the body * Numbness of the face, arm, or leg on one side of the body * Loss of speech or garbled speech    Answer Assessment - Initial Assessment Questions  1. SYMPTOM: \"What is the main symptom you are concerned about?\" (e.g., weakness, numbness)      Patient reporting speech difficulty last evening 5-10 seconds in duration; said was using [employer] and speaking to her phone for a car search and had to \"think really hard to pronounce the words correctly\" felt the words were coming out wrong; said has had some difficulty in the past but this time sounded like \"jibberish\"  2. ONSET: \"When did this start?\" (minutes, hours, days; while sleeping)      Episode x 1, last evening  3. LAST NORMAL: \"When was the last time you (the patient) were normal (no symptoms)?\"      Last evening  4. PATTERN \"Does this come and go, or has it been constant since it started?\"  \"Is it present now?\"      One event as described; patient said completely resolved, reporting mild headache currently 3/10.  5. CARDIAC SYMPTOMS: \"Have you had any of the following symptoms: chest pain, difficulty breathing, palpitations?\"      Denies other symptoms other than mild sinus/allergy headache  6. NEUROLOGIC SYMPTOMS: \"Have you had any of the following symptoms: " "headache, dizziness, vision loss, double vision, changes in speech, unsteady on your feet?\"      Reporting had mild headache currently and did \"mis-step\" when ambulating after episode of speech difficulty last evening, denies fall, patient said she does have hx of MS  7. OTHER SYMPTOMS: \"Do you have any other symptoms?\"      Other than mild headache, currently 3/10; feels is allergy/sinus related and is taking ibuprofen and allergy medication this AM prior to going to work; Denied dizziness, crooked smile, confusion, numbness/extremity weakness.  Recently completed course of methylprednisolone  for URI    8. PREGNANCY: \"Is there any chance you are pregnant?\" \"When was your last menstrual period?\"      N/A    Protocols used: Neurologic Deficit-Adult-[place]    "

## 2025-05-09 NOTE — TELEPHONE ENCOUNTER
Patient is to consider starting aspirin 81 mg for 21 days only - concern for TIA, no MS relapse at this point, no steroids    Agree with ER

## 2025-05-09 NOTE — TELEPHONE ENCOUNTER
Recd call from pt. Pt was evaluated at the ED and had CT done.Was advised to contact PCP or Neuro to request script for MRI Brain w/wo contrast.     Pt reported that she is feeling a little better and is now home resting.     Please advise if script for MRI [place] w/wo contrast can be placed

## 2025-05-09 NOTE — DISCHARGE INSTRUCTIONS
Thank you for visiting the Emergency Department today.    Possible stroke symptoms:  -could be mild TIA (stroke like problem) vs related to MS  -Take aspirin 81mg daily   -start statin  -obtain outpatient MRI brain w/wo contrast (PCP or neuro can order)  -obtain outpatient echocardiogram (PCP or neuro can order. I did place outpatient order as well, they can f/u on results)  -return here if symptoms worsen or other concerns

## 2025-05-09 NOTE — TELEPHONE ENCOUNTER
Spoke with pt and advised her of Dr. DOMINGUEZ's recommendations. Pt verbalizes understanding. Says she thought it would be more related to her MS. Pt agreeable to go to ED for immediate evaluation if symptoms recur.

## 2025-05-09 NOTE — ED PROVIDER NOTES
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                   Assessment & Plan       Medical Decision Making  54-year-old female presenting with episode of change in speech history of MS.  Differential diagnosis includes MS flare, TIA, CVA, focal seizure among other causes.  No scoreable deficits here in the ER.  No recent symptoms.  Not stroke alert candidate no clinical evidence of LVO time of onset outside window for systemic thrombolytics NIH 0.  CT CTA negative for acute findings.  Lab work unremarkable.  Discussed case with Dr. Yoder stroke neurology call agrees with Dr. Mendes's plan as patient is low risk for recurrent events to start patient on 81 mg ASA start statin however proceed with outpatient follow-up including MRI with and without, echo.  Patient agreeable to plan.  I ordered echo and statin she has aspirin she will follow-up with neurology and PCP for the MRI.  She is advised on strict turn ER precautions and is agreeable with the plan.    Problems Addressed:  Episode of change in speech: self-limited or minor problem  History of multiple sclerosis (HCC): chronic illness or injury    Amount and/or Complexity of Data Reviewed  Labs: ordered.  Radiology: ordered.  ECG/medicine tests: ordered and independent  interpretation performed. Decision-making details documented in ED Course.    Risk  Prescription drug management.        ED Course as of 05/09/25 1615   Fri May 09, 2025   1258 EKG interpreted by myself.  EKG dated 5/9/2025 at 1241 demonstrates sinus tachycardia at 102 bpm, normal MT, QRS, QTc durations, nonspecific ST abnormality, no STEMI.       Medications   iohexol (OMNIPAQUE) 350 MG/ML injection (MULTI-DOSE) 85 mL (85 mL Intravenous Given 5/9/25 1336)       ED Risk Strat Scores                    No data recorded        SBIRT 20yo+      Flowsheet Row Most Recent Value   Initial Alcohol Screen: US AUDIT-C     1. How often do you have a drink containing alcohol? 0 Filed at: 05/09/2025 1240   2. How many drinks containing alcohol do you have on a typical day you are drinking?  0 Filed at: 05/09/2025 1240   3a. Male UNDER 65: How often do you have five or more drinks on one occasion? 0 Filed at: 05/09/2025 1240   3b. FEMALE Any Age, or MALE 65+: How often do you have 4 or more drinks on one occassion? 0 Filed at: 05/09/2025 1240   Audit-C Score 0 Filed at: 05/09/2025 1240   EMILIA: How many times in the past year have you...    Used an illegal drug or used a prescription medication for non-medical reasons? Never Filed at: 05/09/2025 1240                            History of Present Illness       Chief Complaint   Patient presents with    Headache     Pt has a hx os MS. Around 8 last night she stated she had some gibberish speech and last he balance 2x. She stated she does have balance issues due to her MS. Also noted a dull headache on the right side. Neurologist s told her to come in to be seen        Past Medical History:   Diagnosis Date    MS (multiple sclerosis) (HCC)       History reviewed. No pertinent surgical history.   History reviewed. No pertinent family history.   Social History     Tobacco Use    Smoking status: Every Day     Current packs/day: 1.00     Average packs/day: 1 pack/day for 8.0 years (8.0  ttl pk-yrs)     Types: Cigarettes    Smokeless tobacco: Never   Vaping Use    Vaping status: Never Used   Substance Use Topics    Alcohol use: Yes     Alcohol/week: 2.0 standard drinks of alcohol     Types: 2 Glasses of wine per week     Comment: occasional    Drug use: Never      E-Cigarette/Vaping    E-Cigarette Use Never User       E-Cigarette/Vaping Substances    Nicotine No     THC No     CBD No     Flavoring No     Other No     Unknown No       I have reviewed and agree with the history as documented.     This is a 54-year-old female whose past medical history significant for MS she presents to the ER for evaluation of an episode of speech difficulty lasting seconds occurring yesterday at 8 PM.  She called her neurologist today about the symptoms and was advised to come to the ER by the triage nurse.  The patient states that since that time she has had no further symptoms like this she does have various neurologic symptoms at times including weakness ataxia tremors sensory paresthesias and very minor speech symptoms which she has attributed to MS and does not think anything of it but states that she never had anything quite like which she experienced a other day.  Denies any head trauma denies any recent illnesses changes in medicines.  Was on steroids about a month ago for MS flare and felt improved with that.      Headache  Associated symptoms: no abdominal pain, no back pain, no cough, no dizziness, no ear pain, no eye pain, no fever, no seizures, no sore throat, no vomiting and no weakness        Review of Systems   Constitutional:  Negative for chills and fever.   HENT:  Negative for ear pain and sore throat.    Eyes:  Negative for pain and visual disturbance.   Respiratory:  Negative for cough and shortness of breath.    Cardiovascular:  Negative for chest pain and palpitations.   Gastrointestinal:  Negative for abdominal pain and vomiting.   Genitourinary:  Negative for dysuria and hematuria.    Musculoskeletal:  Negative for arthralgias and back pain.   Skin:  Negative for color change and rash.   Neurological:  Positive for speech difficulty. Negative for dizziness, tremors, seizures, syncope, weakness, light-headedness and headaches.   All other systems reviewed and are negative.          Objective       ED Triage Vitals [05/09/25 1238]   Temperature Pulse Blood Pressure Respirations SpO2 Patient Position - Orthostatic VS   97.6 °F (36.4 °C) (!) 112 151/79 18 95 % Lying      Temp Source Heart Rate Source BP Location FiO2 (%) Pain Score    Temporal Monitor Right arm -- 3      Vitals      Date and Time Temp Pulse SpO2 Resp BP Pain Score FACES Pain Rating User   05/09/25 1502 -- -- 95 % 20 -- 3 -- BW   05/09/25 1502 -- -- -- -- 109/66 -- -- LN   05/09/25 1300 -- 86 95 % 21 117/74 3 -- BW   05/09/25 1258 -- -- -- -- -- 3 -- BW   05/09/25 1238 97.6 °F (36.4 °C) 112 95 % 18 151/79 3 -- BW            Physical Exam  Vitals and nursing note reviewed.   Constitutional:       General: She is not in acute distress.     Appearance: She is well-developed.   HENT:      Head: Normocephalic and atraumatic.   Eyes:      Conjunctiva/sclera: Conjunctivae normal.   Cardiovascular:      Rate and Rhythm: Normal rate and regular rhythm.      Heart sounds: No murmur heard.  Pulmonary:      Effort: Pulmonary effort is normal. No respiratory distress.      Breath sounds: Normal breath sounds.   Abdominal:      Palpations: Abdomen is soft.      Tenderness: There is no abdominal tenderness.   Musculoskeletal:         General: No swelling.      Cervical back: Neck supple.   Skin:     General: Skin is warm and dry.      Capillary Refill: Capillary refill takes less than 2 seconds.   Neurological:      General: No focal deficit present.      Mental Status: She is alert and oriented to person, place, and time.      GCS: GCS eye subscore is 4. GCS verbal subscore is 5. GCS motor subscore is 6.      Cranial Nerves: Cranial nerves 2-12  are intact.      Sensory: Sensation is intact.      Motor: Motor function is intact.      Coordination: Coordination is intact.      Gait: Gait is intact.      Comments: NIH exam equals 0.  Awake alert oriented  No dysarthria or aphasia  No facial asymmetry  Cranial nerves grossly intact  No pronator drift bilaterally  No truncal ataxia  Normal finger-nose and heel-to-shin     Psychiatric:         Mood and Affect: Mood normal.         Results Reviewed       Procedure Component Value Units Date/Time    Lipid panel [[id number]]  (Abnormal) Collected: 05/09/25 1247    Lab Status: Final result Specimen: Blood from Arm, Left Updated: 05/09/25 1501     Cholesterol 268 mg/dL      Triglycerides 120 mg/dL      HDL, Direct 67 mg/dL      LDL Calculated 177 mg/dL      Non-HDL-Chol (CHOL-HDL) 201 mg/dl     Hemoglobin A1C [[id number]] Collected: 05/09/25 1247    Lab Status: In process Specimen: Blood Updated: 05/09/25 1448    Comprehensive metabolic panel [[id number]]  (Abnormal) Collected: 05/09/25 1247    Lab Status: Final result Specimen: Blood from Arm, Left Updated: 05/09/25 1319     Sodium 141 mmol/L      Potassium 3.2 mmol/L      Chloride 103 mmol/L      CO2 29 mmol/L      ANION GAP 9 mmol/L      BUN 10 mg/dL      Creatinine 0.64 mg/dL      Glucose 85 mg/dL      Calcium 9.9 mg/dL      AST 18 U/L      ALT 28 U/L      Alkaline Phosphatase 63 U/L      Total Protein 7.6 g/dL      Albumin 4.8 g/dL      Total Bilirubin 0.42 mg/dL      eGFR 101 ml/min/1.73sq m     Narrative:      National Kidney Disease Foundation guidelines for Chronic Kidney Disease (CKD):     Stage 1 with normal or high GFR (GFR > 90 mL/min/1.73 square meters)    Stage 2 Mild CKD (GFR = 60-89 mL/min/1.73 square meters)    Stage 3A Moderate CKD (GFR = 45-59 mL/min/1.73 square meters)    Stage 3B Moderate CKD (GFR = 30-44 mL/min/1.73 square meters)    Stage 4 Severe CKD (GFR = 15-29 mL/min/1.73 square meters)    Stage 5 End Stage CKD (GFR <15 mL/min/1.73 square  meters)  Note: GFR calculation is accurate only with a steady state creatinine    FLU/COVID Rapid Antigen (30 min. TAT) - Preferred screening test in ED [[id number]]  (Normal) Collected: 05/09/25 1247    Lab Status: Final result Specimen: Nares from Nose Updated: 05/09/25 1317     SARS COV Rapid Antigen Negative     Influenza A Rapid Antigen Negative     Influenza B Rapid Antigen Negative    Narrative:      This test has been performed using the Medifocusidel Kasandra 2 FLU+SARS Antigen test under the Emergency Use Authorization (EUA). This test has been validated by the  and verified by the performing laboratory. The Kasandra uses lateral flow immunofluorescent sandwich assay to detect SARS-COV, Influenza A and Influenza B Antigen.     The Quidel Kasandra 2 SARS Antigen test does not differentiate between SARS-CoV and SARS-CoV-2.     Negative results are presumptive and may be confirmed with a molecular assay, if necessary, for patient management. Negative results do not rule out SARS-CoV-2 or influenza infection and should not be used as the sole basis for treatment or patient management decisions. A negative test result may occur if the level of antigen in a sample is below the limit of detection of this test.     Positive results are indicative of the presence of viral antigens, but do not rule out bacterial infection or co-infection with other viruses.     All test results should be used as an adjunct to clinical observations and other information available to the provider.    FOR PEDIATRIC PATIENTS - copy/paste COVID Guidelines URL to browser: [email or web address]    CBC and differential [[id number]]  (Abnormal) Collected: 05/09/25 1247    Lab Status: Final result Specimen: Blood from Arm, Left Updated: 05/09/25 1258     WBC 9.73 Thousand/uL      RBC 5.06 Million/uL      Hemoglobin 15.3 g/dL      Hematocrit 46.7 %      MCV 92 fL      MCH 30.2 pg      MCHC 32.8  "g/dL      RDW 14.0 %      MPV 10.9 fL      Platelets 298 Thousands/uL      nRBC 0 /100 WBCs      Segmented % 47 %      Immature Grans % 0 %      Lymphocytes % 35 %      Monocytes % 7 %      Eosinophils Relative 9 %      Basophils Relative 2 %      Absolute Neutrophils 4.60 Thousands/µL      Absolute Immature Grans 0.04 Thousand/uL      Absolute Lymphocytes 3.36 Thousands/µL      Absolute Monocytes 0.65 Thousand/µL      Eosinophils Absolute 0.89 Thousand/µL      Basophils Absolute 0.19 Thousands/µL             CTA head and neck with and without contrast   Final Interpretation by Marcos Coronado MD (05/09 2922)      CT Brain:  No acute intracranial abnormality.      CT Angiography:  Unremarkable CTA neck and brain.                  Workstation performed: [id number]             Procedures    ED Medication and Procedure Management   Prior to Admission Medications   Prescriptions Last Dose Informant Patient Reported? Taking?   FLUoxetine (PROzac) 10 mg capsule   No No   Sig: take 1 capsule by mouth once daily   Loratadine 10 MG CAPS  Self Yes No   Sig: Take by mouth as needed   SYRINGE-NEEDLE, DISP, 3 ML 25G X 1\" 3 ML MISC  Self No No   Sig: Inject into a muscle every 28 days   SYRINGE-NEEDLE, DISP, 3 ML 25G X 1\" 3 ML MISC   No No   Sig: Inject into a muscle every 28 days   Teriflunomide 14 MG TABS   No No   Sig: Take 1 tablet (14 mg total) by mouth in the morning   amoxicillin (AMOXIL) 500 MG tablet  Self Yes No   Sig: Take 500 mg by mouth every 8 (eight) hours   cholecalciferol (VITAMIN D3) 1,000 units tablet  Self Yes No   Sig: Take 1,000 Units by mouth daily Take 1tabs (1000mg) PO daily   cyanocobalamin 1,000 mcg/mL   No No   Sig: inject 1 MILLILITER intramuscularly every week for 4 weeks then Every Month for 5 MONTHS   cyanocobalamin 1,000 mcg/mL   No No   Sig: Take B12 1000 mcg IM once a week for 4 weeks, then once a month for 5 months   ergocalciferol (VITAMIN D2) 50,000 units   No No   Sig: take 1 capsule by " mouth ONCE EVERY WEEK   fluticasone (FLONASE) 50 mcg/act nasal spray  Self No No   Si sprays into each nostril daily   ibuprofen (MOTRIN) 800 mg tablet   No No   Sig: take 1 tablet by mouth every 8 hours if needed for moderate pain   methylPREDNISolone 4 MG tablet therapy pack   No No   Sig: Use as directed on package   phenazopyridine (PYRIDIUM) 100 mg tablet   No No   Sig: Take 1 tablet (100 mg total) by mouth 3 (three) times a day as needed for bladder spasms      Facility-Administered Medications: None     Discharge Medication List as of 2025  3:01 PM        START taking these medications    Details   atorvastatin (LIPITOR) 40 mg tablet Take 1 tablet (40 mg total) by mouth daily, Starting Fri 2025, Normal           CONTINUE these medications which have NOT CHANGED    Details   amoxicillin (AMOXIL) 500 MG tablet Take 500 mg by mouth every 8 (eight) hours, Starting Thu 2024, Historical Med      cholecalciferol (VITAMIN D3) 1,000 units tablet Take 1,000 Units by mouth daily Take 1tabs (1000mg) PO daily, Historical Med      !! cyanocobalamin 1,000 mcg/mL inject 1 MILLILITER intramuscularly every week for 4 weeks then Every Month for 5 MONTHS, Normal      !! cyanocobalamin 1,000 mcg/mL Take B12 1000 mcg IM once a week for 4 weeks, then once a month for 5 months, Normal      ergocalciferol (VITAMIN D2) 50,000 units take 1 capsule by mouth ONCE EVERY WEEK, Starting 2025, Normal      FLUoxetine (PROzac) 10 mg capsule take 1 capsule by mouth once daily, Starting 2025, Normal      fluticasone (FLONASE) 50 mcg/act nasal spray 2 sprays into each nostril daily, Starting Sat 2023, Normal      ibuprofen (MOTRIN) 800 mg tablet take 1 tablet by mouth every 8 hours if needed for moderate pain, Normal      Loratadine 10 MG CAPS Take by mouth as needed, Historical Med      methylPREDNISolone 4 MG tablet therapy pack Use as directed on package, Normal      phenazopyridine (PYRIDIUM) 100 mg  "tablet Take 1 tablet (100 mg total) by mouth 3 (three) times a day as needed for bladder spasms, Starting Sun 3/2/2025, Normal      !! SYRINGE-NEEDLE, DISP, 3 ML 25G X 1\" 3 ML MISC Inject into a muscle every 28 days, Starting Mon 12/4/2023, Normal      !! SYRINGE-NEEDLE, DISP, 3 ML 25G X 1\" 3 ML MISC Inject into a muscle every 28 days, Starting Wed 1/29/2025, Normal      Teriflunomide 14 MG TABS Take 1 tablet (14 mg total) by mouth in the morning, Starting Mon 7/29/2024, Normal       !! - Potential duplicate medications found. Please discuss with provider.        Outpatient Discharge Orders   Ambulatory Referral to Neurology   Standing Status: Future Standing Exp. Date: 05/09/26      Echo complete w/ contrast if indicated   Standing Status: Future Standing Exp. 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

## 2025-05-10 ENCOUNTER — RESULTS FOLLOW-UP (OUTPATIENT)
Dept: EMERGENCY DEPT | Facility: HOSPITAL | Age: 55
End: 2025-05-10

## 2025-05-12 ENCOUNTER — HOSPITAL ENCOUNTER (OUTPATIENT)
Dept: NON INVASIVE DIAGNOSTICS | Facility: HOSPITAL | Age: 55
Discharge: HOME | End: 2025-05-12
Attending: EMERGENCY MEDICINE
Payer: COMMERCIAL

## 2025-05-12 VITALS
SYSTOLIC BLOOD PRESSURE: 109 MMHG | HEART RATE: 86 BPM | BODY MASS INDEX: 27.32 KG/M2 | DIASTOLIC BLOOD PRESSURE: 66 MMHG | WEIGHT: 164 LBS | HEIGHT: 65 IN

## 2025-05-12 DIAGNOSIS — R47.89 EPISODE OF CHANGE IN SPEECH: ICD-10-CM

## 2025-05-12 LAB
AORTIC ROOT: 2.7 CM
ASCENDING AORTA: 2.9 CM
ATRIAL RATE: 102 BPM
BSA FOR ECHO PROCEDURE: 1.82 M2
E WAVE DECELERATION TIME: 178 MS
E/A RATIO: 1.32
FRACTIONAL SHORTENING: 34 (ref 28–44)
INTERVENTRICULAR SEPTUM IN DIASTOLE (PARASTERNAL SHORT AXIS VIEW): 0.7 CM
INTERVENTRICULAR SEPTUM: 0.7 CM (ref 0.6–1.1)
LAAS-AP2: 15.9 CM2
LAAS-AP4: 16.5 CM2
LEFT ATRIUM SIZE: 3.2 CM
LEFT ATRIUM VOLUME (MOD BIPLANE): 41 ML
LEFT ATRIUM VOLUME INDEX (MOD BIPLANE): 22.5 ML/M2
LEFT INTERNAL DIMENSION IN SYSTOLE: 2.9 CM (ref 2.1–4)
LEFT VENTRICULAR INTERNAL DIMENSION IN DIASTOLE: 4.4 CM (ref 3.5–6)
LEFT VENTRICULAR POSTERIOR WALL IN END DIASTOLE: 0.7 CM
LEFT VENTRICULAR STROKE VOLUME: 56 ML
LV EF US.2D.A4C+ESTIMATED: 71 %
LVSV (TEICH): 56 ML
MV E'TISSUE VEL-LAT: 11 CM/S
MV E'TISSUE VEL-SEP: 10 CM/S
MV PEAK A VEL: 0.71 M/S
MV PEAK E VEL: 94 CM/S
MV STENOSIS PRESSURE HALF TIME: 52 MS
MV VALVE AREA P 1/2 METHOD: 4.23
P AXIS: 72 DEGREES
PR INTERVAL: 140 MS
QRS AXIS: 78 DEGREES
QRSD INTERVAL: 78 MS
QT INTERVAL: 364 MS
QTC INTERVAL: 474 MS
RIGHT ATRIUM AREA SYSTOLE A4C: 12.5 CM2
RIGHT VENTRICLE ID DIMENSION: 2.9 CM
SL CV LEFT ATRIUM LENGTH A2C: 5.1 CM
SL CV LV EF: 60
SL CV PED ECHO LEFT VENTRICLE DIASTOLIC VOLUME (MOD BIPLANE) 2D: 88 ML
SL CV PED ECHO LEFT VENTRICLE SYSTOLIC VOLUME (MOD BIPLANE) 2D: 32 ML
T WAVE AXIS: 59 DEGREES
TRICUSPID ANNULAR PLANE SYSTOLIC EXCURSION: 1.9 CM
TRICUSPID VALVE PEAK REGURGITATION VELOCITY: 0.95 M/S
VENTRICULAR RATE: 102 BPM

## 2025-05-12 PROCEDURE — 93306 TTE W/DOPPLER COMPLETE: CPT

## 2025-05-12 PROCEDURE — 93306 TTE W/DOPPLER COMPLETE: CPT | Performed by: INTERNAL MEDICINE

## 2025-05-12 PROCEDURE — 93010 ELECTROCARDIOGRAM REPORT: CPT | Performed by: INTERNAL MEDICINE

## 2025-07-16 ENCOUNTER — TELEPHONE (OUTPATIENT)
Dept: NEUROLOGY | Facility: CLINIC | Age: 55
End: 2025-07-16

## 2025-07-16 NOTE — TELEPHONE ENCOUNTER
Mailed appt reminder card in the mail for upcoming Neurology appt with Dr Mendes to their home address.

## 2025-07-24 NOTE — TELEPHONE ENCOUNTER
Spoke with Chloé confirming and reminding her of her appt on 7/30/25  at 1:30pm with Dr Mendes at the [facility]. I asked she come 15mins prior to appt to allow us time to update her chart as needed. I also asked if she is unable to make the appt and needs to reschedule or cancel to please call the office [phone].

## 2025-07-30 ENCOUNTER — OFFICE VISIT (OUTPATIENT)
Dept: NEUROLOGY | Facility: CLINIC | Age: 55
End: 2025-07-30
Payer: COMMERCIAL

## 2025-07-30 VITALS
WEIGHT: 163.6 LBS | TEMPERATURE: 98.1 F | HEIGHT: 65 IN | OXYGEN SATURATION: 96 % | SYSTOLIC BLOOD PRESSURE: 118 MMHG | DIASTOLIC BLOOD PRESSURE: 72 MMHG | BODY MASS INDEX: 27.26 KG/M2 | RESPIRATION RATE: 20 BRPM | HEART RATE: 90 BPM

## 2025-07-30 DIAGNOSIS — G35 MS (MULTIPLE SCLEROSIS) (HCC): Primary | ICD-10-CM

## 2025-07-30 PROCEDURE — 99215 OFFICE O/P EST HI 40 MIN: CPT | Performed by: PSYCHIATRY & NEUROLOGY
